# Patient Record
Sex: FEMALE | Race: BLACK OR AFRICAN AMERICAN | NOT HISPANIC OR LATINO | Employment: FULL TIME | URBAN - METROPOLITAN AREA
[De-identification: names, ages, dates, MRNs, and addresses within clinical notes are randomized per-mention and may not be internally consistent; named-entity substitution may affect disease eponyms.]

---

## 2018-02-18 PROCEDURE — 87081 CULTURE SCREEN ONLY: CPT | Performed by: EMERGENCY MEDICINE

## 2018-02-20 PROBLEM — J35.8 ASYMMETRIC TONSILS: Status: ACTIVE | Noted: 2018-02-20

## 2021-11-23 ENCOUNTER — HOSPITAL ENCOUNTER (EMERGENCY)
Age: 26
Discharge: HOME | End: 2021-11-24
Attending: EMERGENCY MEDICINE

## 2021-11-23 DIAGNOSIS — J10.1 INFLUENZA A: Primary | ICD-10-CM

## 2021-11-23 DIAGNOSIS — I31.9 MYOPERICARDITIS: ICD-10-CM

## 2021-11-23 LAB — LACTATE BLDV-SCNC: 1.4 MMOL/L

## 2021-11-23 PROCEDURE — 99285 EMERGENCY DEPT VISIT HI MDM: CPT

## 2021-11-23 PROCEDURE — 10004281 HB COUNTER-STAFF TIME PER 15 MIN

## 2021-11-23 PROCEDURE — 83605 ASSAY OF LACTIC ACID: CPT

## 2021-11-23 PROCEDURE — 96374 THER/PROPH/DIAG INJ IV PUSH: CPT

## 2021-11-23 PROCEDURE — C9803 HOPD COVID-19 SPEC COLLECT: HCPCS

## 2021-11-23 PROCEDURE — 96375 TX/PRO/DX INJ NEW DRUG ADDON: CPT

## 2021-11-23 PROCEDURE — 96361 HYDRATE IV INFUSION ADD-ON: CPT

## 2021-11-24 ENCOUNTER — APPOINTMENT (OUTPATIENT)
Dept: GENERAL RADIOLOGY | Age: 26
End: 2021-11-24
Attending: EMERGENCY MEDICINE

## 2021-11-24 ENCOUNTER — APPOINTMENT (OUTPATIENT)
Dept: CT IMAGING | Age: 26
End: 2021-11-24
Attending: EMERGENCY MEDICINE

## 2021-11-24 VITALS
HEIGHT: 67 IN | HEART RATE: 89 BPM | SYSTOLIC BLOOD PRESSURE: 138 MMHG | BODY MASS INDEX: 43.94 KG/M2 | WEIGHT: 279.98 LBS | TEMPERATURE: 98.9 F | RESPIRATION RATE: 18 BRPM | DIASTOLIC BLOOD PRESSURE: 82 MMHG | OXYGEN SATURATION: 99 %

## 2021-11-24 LAB
ALBUMIN SERPL-MCNC: 3.7 G/DL (ref 3.6–5.1)
ALBUMIN/GLOB SERPL: 0.9 {RATIO} (ref 1–2.4)
ALP SERPL-CCNC: 67 UNITS/L (ref 45–117)
ALT SERPL-CCNC: 31 UNITS/L
ANION GAP SERPL CALC-SCNC: 11 MMOL/L (ref 10–20)
AST SERPL-CCNC: 43 UNITS/L
ATRIAL RATE (BPM): 103
ATRIAL RATE (BPM): 90
BASOPHILS # BLD: 0 K/MCL (ref 0–0.3)
BASOPHILS NFR BLD: 1 %
BILIRUB SERPL-MCNC: 0.4 MG/DL (ref 0.2–1)
BUN SERPL-MCNC: 12 MG/DL (ref 6–20)
BUN/CREAT SERPL: 10 (ref 7–25)
CALCIUM SERPL-MCNC: 9.1 MG/DL (ref 8.4–10.2)
CHLORIDE SERPL-SCNC: 108 MMOL/L (ref 98–107)
CO2 SERPL-SCNC: 23 MMOL/L (ref 21–32)
CREAT SERPL-MCNC: 1.2 MG/DL (ref 0.51–0.95)
D DIMER PPP FEU-MCNC: 0.65 MG/L (FEU)
DEPRECATED RDW RBC: 40.6 FL (ref 39–50)
EOSINOPHIL # BLD: 0.1 K/MCL (ref 0–0.5)
EOSINOPHIL NFR BLD: 1 %
ERYTHROCYTE [DISTWIDTH] IN BLOOD: 13 % (ref 11–15)
FASTING DURATION TIME PATIENT: ABNORMAL H
FLUAV RNA NPH QL NAA+PROBE: DETECTED
FLUBV RNA NPH QL NAA+PROBE: NOT DETECTED
GFR SERPLBLD BASED ON 1.73 SQ M-ARVRAT: 62 ML/MIN
GLOBULIN SER-MCNC: 3.9 G/DL (ref 2–4)
GLUCOSE SERPL-MCNC: 100 MG/DL (ref 70–99)
HCG SERPL QL: NEGATIVE
HCT VFR BLD CALC: 38.7 % (ref 36–46.5)
HETEROPH AB SERPL QL IA: NEGATIVE
HGB BLD-MCNC: 13 G/DL (ref 12–15.5)
IMM GRANULOCYTES # BLD AUTO: 0 K/MCL (ref 0–0.2)
IMM GRANULOCYTES # BLD: 0 %
LYMPHOCYTES # BLD: 1.8 K/MCL (ref 1–4.8)
LYMPHOCYTES NFR BLD: 26 %
MCH RBC QN AUTO: 28.9 PG (ref 26–34)
MCHC RBC AUTO-ENTMCNC: 33.6 G/DL (ref 32–36.5)
MCV RBC AUTO: 86 FL (ref 78–100)
MONOCYTES # BLD: 1 K/MCL (ref 0.3–0.9)
MONOCYTES NFR BLD: 15 %
NEUTROPHILS # BLD: 4 K/MCL (ref 1.8–7.7)
NEUTROPHILS NFR BLD: 57 %
NRBC BLD MANUAL-RTO: 0 /100 WBC
P AXIS (DEGREES): 21
P AXIS (DEGREES): 23
PLATELET # BLD AUTO: 328 K/MCL (ref 140–450)
POTASSIUM SERPL-SCNC: 3.5 MMOL/L (ref 3.4–5.1)
PR-INTERVAL (MSEC): 154
PR-INTERVAL (MSEC): 166
PROT SERPL-MCNC: 7.6 G/DL (ref 6.4–8.2)
QRS-INTERVAL (MSEC): 68
QRS-INTERVAL (MSEC): 80
QT-INTERVAL (MSEC): 314
QT-INTERVAL (MSEC): 362
QTC: 411
QTC: 443
R AXIS (DEGREES): 49
R AXIS (DEGREES): 49
RAINBOW EXTRA TUBES HOLD SPECIMEN: NORMAL
RBC # BLD: 4.5 MIL/MCL (ref 4–5.2)
REPORT TEXT: NORMAL
REPORT TEXT: NORMAL
S PYO DNA THROAT QL NAA+PROBE: NOT DETECTED
SARS-COV-2 RNA RESP QL NAA+PROBE: NOT DETECTED
SARS-COV-2 RNA RESP QL NAA+PROBE: NOT DETECTED
SERVICE CMNT-IMP: ABNORMAL
SERVICE CMNT-IMP: ABNORMAL
SERVICE CMNT-IMP: NORMAL
SERVICE CMNT-IMP: NORMAL
SODIUM SERPL-SCNC: 138 MMOL/L (ref 135–145)
T AXIS (DEGREES): 1
T AXIS (DEGREES): 12
TROPONIN I SERPL DL<=0.01 NG/ML-MCNC: 131 NG/L
TROPONIN I SERPL DL<=0.01 NG/ML-MCNC: 138 NG/L
VENTRICULAR RATE EKG/MIN (BPM): 103
VENTRICULAR RATE EKG/MIN (BPM): 90
WBC # BLD: 7 K/MCL (ref 4.2–11)

## 2021-11-24 PROCEDURE — 71045 X-RAY EXAM CHEST 1 VIEW: CPT

## 2021-11-24 PROCEDURE — 70491 CT SOFT TISSUE NECK W/DYE: CPT

## 2021-11-24 PROCEDURE — 87040 BLOOD CULTURE FOR BACTERIA: CPT | Performed by: EMERGENCY MEDICINE

## 2021-11-24 PROCEDURE — 10002800 HB RX 250 W HCPCS: Performed by: EMERGENCY MEDICINE

## 2021-11-24 PROCEDURE — 87635 SARS-COV-2 COVID-19 AMP PRB: CPT | Performed by: EMERGENCY MEDICINE

## 2021-11-24 PROCEDURE — 86308 HETEROPHILE ANTIBODY SCREEN: CPT | Performed by: EMERGENCY MEDICINE

## 2021-11-24 PROCEDURE — 84484 ASSAY OF TROPONIN QUANT: CPT | Performed by: EMERGENCY MEDICINE

## 2021-11-24 PROCEDURE — 87636 SARSCOV2 & INF A&B AMP PRB: CPT | Performed by: EMERGENCY MEDICINE

## 2021-11-24 PROCEDURE — 85379 FIBRIN DEGRADATION QUANT: CPT | Performed by: EMERGENCY MEDICINE

## 2021-11-24 PROCEDURE — 93005 ELECTROCARDIOGRAM TRACING: CPT | Performed by: EMERGENCY MEDICINE

## 2021-11-24 PROCEDURE — 87651 STREP A DNA AMP PROBE: CPT | Performed by: EMERGENCY MEDICINE

## 2021-11-24 PROCEDURE — 86665 EPSTEIN-BARR CAPSID VCA: CPT | Performed by: EMERGENCY MEDICINE

## 2021-11-24 PROCEDURE — 10002805 HB CONTRAST AGENT: Performed by: EMERGENCY MEDICINE

## 2021-11-24 PROCEDURE — 10002807 HB RX 258: Performed by: EMERGENCY MEDICINE

## 2021-11-24 PROCEDURE — 80053 COMPREHEN METABOLIC PANEL: CPT | Performed by: EMERGENCY MEDICINE

## 2021-11-24 PROCEDURE — 84703 CHORIONIC GONADOTROPIN ASSAY: CPT | Performed by: EMERGENCY MEDICINE

## 2021-11-24 PROCEDURE — 85025 COMPLETE CBC W/AUTO DIFF WBC: CPT | Performed by: EMERGENCY MEDICINE

## 2021-11-24 PROCEDURE — 71275 CT ANGIOGRAPHY CHEST: CPT

## 2021-11-24 RX ORDER — DEXAMETHASONE SODIUM PHOSPHATE 4 MG/ML
10 INJECTION, SOLUTION INTRA-ARTICULAR; INTRALESIONAL; INTRAMUSCULAR; INTRAVENOUS; SOFT TISSUE ONCE
Status: COMPLETED | OUTPATIENT
Start: 2021-11-24 | End: 2021-11-24

## 2021-11-24 RX ORDER — IBUPROFEN 600 MG/1
600 TABLET ORAL 2 TIMES DAILY
Qty: 14 TABLET | Refills: 0 | Status: SHIPPED | OUTPATIENT
Start: 2021-11-24 | End: 2022-04-22 | Stop reason: ALTCHOICE

## 2021-11-24 RX ORDER — HYDROCODONE BITARTRATE AND HOMATROPINE METHYLBROMIDE ORAL SOLUTION 5; 1.5 MG/5ML; MG/5ML
5 LIQUID ORAL EVERY 6 HOURS PRN
Qty: 240 ML | Refills: 0 | Status: SHIPPED | OUTPATIENT
Start: 2021-11-24 | End: 2022-04-22 | Stop reason: ALTCHOICE

## 2021-11-24 RX ORDER — CEFAZOLIN SODIUM/WATER 2 G/20 ML
2000 SYRINGE (ML) INTRAVENOUS ONCE
Status: COMPLETED | OUTPATIENT
Start: 2021-11-24 | End: 2021-11-24

## 2021-11-24 RX ORDER — BENZONATATE 100 MG/1
100 CAPSULE ORAL 3 TIMES DAILY PRN
Qty: 20 CAPSULE | Refills: 0 | Status: SHIPPED | OUTPATIENT
Start: 2021-11-24 | End: 2022-04-22 | Stop reason: ALTCHOICE

## 2021-11-24 RX ADMIN — SODIUM CHLORIDE 1000 ML: 0.9 INJECTION, SOLUTION INTRAVENOUS at 00:24

## 2021-11-24 RX ADMIN — IOHEXOL 75 ML: 350 INJECTION, SOLUTION INTRAVENOUS at 02:14

## 2021-11-24 RX ADMIN — DEXAMETHASONE SODIUM PHOSPHATE 10 MG: 4 INJECTION, SOLUTION INTRAMUSCULAR; INTRAVENOUS at 00:26

## 2021-11-24 RX ADMIN — Medication 2000 MG: at 00:28

## 2021-11-24 ASSESSMENT — PAIN SCALES - GENERAL: PAINLEVEL_OUTOF10: 4

## 2021-11-24 ASSESSMENT — PAIN DESCRIPTION - PAIN TYPE: TYPE: ACUTE PAIN

## 2021-11-26 LAB
EBV VCA IGG SER-ACNC: 8 AI
EBV VCA IGM SER-ACNC: 0.2 AI

## 2021-11-28 LAB
BACTERIA BLD CULT: NORMAL
BACTERIA BLD CULT: NORMAL

## 2022-02-16 ENCOUNTER — TELEPHONE (OUTPATIENT)
Dept: SCHEDULING | Age: 27
End: 2022-02-16

## 2022-02-17 ENCOUNTER — APPOINTMENT (OUTPATIENT)
Dept: URGENT CARE | Age: 27
End: 2022-02-17

## 2022-04-20 ENCOUNTER — TELEPHONE (OUTPATIENT)
Dept: SCHEDULING | Age: 27
End: 2022-04-20

## 2022-04-21 ENCOUNTER — TELEPHONE (OUTPATIENT)
Dept: SCHEDULING | Age: 27
End: 2022-04-21

## 2022-04-22 ENCOUNTER — WALK IN (OUTPATIENT)
Dept: URGENT CARE | Age: 27
End: 2022-04-22

## 2022-04-22 ENCOUNTER — APPOINTMENT (OUTPATIENT)
Dept: URGENT CARE | Age: 27
End: 2022-04-22

## 2022-04-22 VITALS
HEIGHT: 69 IN | DIASTOLIC BLOOD PRESSURE: 82 MMHG | HEART RATE: 84 BPM | OXYGEN SATURATION: 99 % | WEIGHT: 281 LBS | TEMPERATURE: 97.3 F | BODY MASS INDEX: 41.62 KG/M2 | RESPIRATION RATE: 16 BRPM | SYSTOLIC BLOOD PRESSURE: 126 MMHG

## 2022-04-22 DIAGNOSIS — Z11.1 SCREENING FOR TUBERCULOSIS: Primary | ICD-10-CM

## 2022-04-22 DIAGNOSIS — Z02.1 PHYSICAL EXAM, PRE-EMPLOYMENT: ICD-10-CM

## 2022-04-22 PROCEDURE — X0945 SELF PAY APN OR PA PERFORMED ADMINISTRATIVE PHYSICAL: HCPCS | Performed by: NURSE PRACTITIONER

## 2022-04-22 PROCEDURE — 86580 TB INTRADERMAL TEST: CPT | Performed by: NURSE PRACTITIONER

## 2022-04-22 RX ORDER — ACETAMINOPHEN 325 MG/1
650 TABLET ORAL AT BEDTIME
COMMUNITY

## 2022-04-22 RX ORDER — FEXOFENADINE HCL 180 MG/1
180 TABLET ORAL DAILY
COMMUNITY

## 2022-04-22 ASSESSMENT — LIFESTYLE VARIABLES: SUBSTANCE_ABUSE: 0

## 2022-04-22 ASSESSMENT — ENCOUNTER SYMPTOMS
SORE THROAT: 0
TREMORS: 0
BRUISES/BLEEDS EASILY: 0
POLYDIPSIA: 0
ABDOMINAL PAIN: 0
FOCAL WEAKNESS: 0
HALLUCINATIONS: 0
HEADACHES: 1
SENSORY CHANGE: 0
TINGLING: 0
CONSTIPATION: 0
MEMORY LOSS: 0
NAUSEA: 1
BACK PAIN: 1
STRIDOR: 0
SINUS PAIN: 0
LOSS OF CONSCIOUSNESS: 0
DIZZINESS: 0
BLOOD IN STOOL: 0
HEARTBURN: 1
CONSTITUTIONAL NEGATIVE: 1
INSOMNIA: 1
RESPIRATORY NEGATIVE: 1
DEPRESSION: 0
VOMITING: 0
WEAKNESS: 0
DIARRHEA: 0
EYES NEGATIVE: 1
NERVOUS/ANXIOUS: 0
SPEECH CHANGE: 0
SEIZURES: 0

## 2022-04-22 ASSESSMENT — VISUAL ACUITY: OU: 1

## 2022-04-23 ENCOUNTER — TELEPHONE (OUTPATIENT)
Dept: SCHEDULING | Age: 27
End: 2022-04-23

## 2022-04-24 ENCOUNTER — WALK IN (OUTPATIENT)
Dept: URGENT CARE | Age: 27
End: 2022-04-24

## 2022-04-24 DIAGNOSIS — Z11.1 SCREENING FOR TUBERCULOSIS: Primary | ICD-10-CM

## 2022-12-30 ENCOUNTER — OFFICE VISIT (OUTPATIENT)
Dept: FAMILY MEDICINE CLINIC | Facility: CLINIC | Age: 27
End: 2022-12-30
Payer: COMMERCIAL

## 2022-12-30 ENCOUNTER — APPOINTMENT (OUTPATIENT)
Dept: GENERAL RADIOLOGY | Age: 27
End: 2022-12-30
Attending: EMERGENCY MEDICINE

## 2022-12-30 DIAGNOSIS — U07.1 COVID: Primary | ICD-10-CM

## 2022-12-30 DIAGNOSIS — R06.2 WHEEZE: ICD-10-CM

## 2022-12-30 LAB
OPERATOR ID: ABNORMAL
POCT LOT NUMBER: ABNORMAL
RAPID SARS-COV-2 BY PCR: DETECTED

## 2022-12-30 PROCEDURE — U0002 COVID-19 LAB TEST NON-CDC: HCPCS | Performed by: NURSE PRACTITIONER

## 2022-12-30 PROCEDURE — 99202 OFFICE O/P NEW SF 15 MIN: CPT | Performed by: NURSE PRACTITIONER

## 2022-12-30 PROCEDURE — 71046 X-RAY EXAM CHEST 2 VIEWS: CPT

## 2022-12-30 PROCEDURE — 99283 EMERGENCY DEPT VISIT LOW MDM: CPT

## 2022-12-30 RX ORDER — BENZONATATE 200 MG/1
200 CAPSULE ORAL 3 TIMES DAILY PRN
Qty: 30 CAPSULE | Refills: 0 | Status: SHIPPED | OUTPATIENT
Start: 2022-12-30

## 2022-12-30 RX ORDER — ALBUTEROL SULFATE 90 UG/1
2 AEROSOL, METERED RESPIRATORY (INHALATION) EVERY 6 HOURS PRN
Qty: 1 EACH | Refills: 0 | Status: SHIPPED | OUTPATIENT
Start: 2022-12-30

## 2022-12-30 ASSESSMENT — PAIN SCALES - GENERAL
PAINLEVEL_OUTOF10: 2
PAINLEVEL_OUTOF10: 8

## 2022-12-30 NOTE — PATIENT INSTRUCTIONS
-Covid Positive  -Day #6 is 1/2/2023  -Follow CDC guidelines  -Push fluids and plenty of rest    -albuterol as prescribed  -Benzonatate as prescribed for dry cough  -OTC cough medicine such as Mucinex DM or Robitussin DM (guaifenesin and dextromethorphan) as packet insert for congested cough. -OTC Tylenol/Ibuprofen as packet insert If no allergies  -Soothing cough drops as packet insert   -Flonase OTC 2 sprays each nostril daily as packet insert.   Stop if any nose bleeds  -Good handwashing, to prevent spread of virus    -Face mask helps prevent viral infections    Follow up in 3-5 days for worsening symptoms with PCP

## 2022-12-31 ENCOUNTER — HOSPITAL ENCOUNTER (EMERGENCY)
Age: 27
Discharge: HOME | End: 2022-12-31
Attending: EMERGENCY MEDICINE

## 2022-12-31 VITALS
HEART RATE: 84 BPM | RESPIRATION RATE: 17 BRPM | BODY MASS INDEX: 43.39 KG/M2 | TEMPERATURE: 98.2 F | WEIGHT: 270 LBS | OXYGEN SATURATION: 99 % | SYSTOLIC BLOOD PRESSURE: 131 MMHG | DIASTOLIC BLOOD PRESSURE: 72 MMHG | HEIGHT: 66 IN

## 2022-12-31 DIAGNOSIS — U07.1 COVID-19 VIRUS INFECTION: Primary | ICD-10-CM

## 2024-01-30 ENCOUNTER — HOSPITAL ENCOUNTER (OUTPATIENT)
Age: 29
Setting detail: OBSERVATION
Discharge: HOME | End: 2024-01-30
Attending: EMERGENCY MEDICINE | Admitting: FAMILY MEDICINE

## 2024-01-30 ENCOUNTER — APPOINTMENT (OUTPATIENT)
Dept: CT IMAGING | Age: 29
End: 2024-01-30
Attending: EMERGENCY MEDICINE

## 2024-01-30 ENCOUNTER — APPOINTMENT (OUTPATIENT)
Dept: GENERAL RADIOLOGY | Age: 29
End: 2024-01-30
Attending: EMERGENCY MEDICINE

## 2024-01-30 ENCOUNTER — APPOINTMENT (OUTPATIENT)
Dept: CARDIOLOGY | Age: 29
End: 2024-01-30
Attending: NURSE PRACTITIONER

## 2024-01-30 VITALS
SYSTOLIC BLOOD PRESSURE: 163 MMHG | DIASTOLIC BLOOD PRESSURE: 92 MMHG | OXYGEN SATURATION: 97 % | RESPIRATION RATE: 18 BRPM | WEIGHT: 286.82 LBS | TEMPERATURE: 97.7 F | BODY MASS INDEX: 45.02 KG/M2 | HEIGHT: 67 IN | HEART RATE: 102 BPM

## 2024-01-30 DIAGNOSIS — U07.1 COVID-19 VIRUS INFECTION: Primary | ICD-10-CM

## 2024-01-30 LAB
ALBUMIN SERPL-MCNC: 3.8 G/DL (ref 3.6–5.1)
ALBUMIN/GLOB SERPL: 1 {RATIO} (ref 1–2.4)
ALP SERPL-CCNC: 69 UNITS/L (ref 45–117)
ALT SERPL-CCNC: 18 UNITS/L
ANION GAP SERPL CALC-SCNC: 9 MMOL/L (ref 7–19)
AORTIC VALVE AREA (AVA): 1.05
AORTIC VALVE AREA: 2.44
AST SERPL-CCNC: 15 UNITS/L
ATRIAL RATE (BPM): 106
AV MEAN GRADIENT (AVMG): 6
AV MEAN VELOCITY (AVMV): 1.15
AV PEAK GRADIENT (AVPG): 15
AV PEAK VELOCITY (AVPV): 1.92
AV STENOSIS SEVERITY TEXT: NORMAL
AVI LVOT PEAK GRADIENT (LVOTMG): 1
BASOPHILS # BLD: 0 K/MCL (ref 0–0.3)
BASOPHILS NFR BLD: 1 %
BILIRUB SERPL-MCNC: 0.7 MG/DL (ref 0.2–1)
BUN SERPL-MCNC: 8 MG/DL (ref 6–20)
BUN/CREAT SERPL: 7 (ref 7–25)
CALCIUM SERPL-MCNC: 8.7 MG/DL (ref 8.4–10.2)
CHLORIDE SERPL-SCNC: 106 MMOL/L (ref 97–110)
CO2 SERPL-SCNC: 25 MMOL/L (ref 21–32)
CREAT SERPL-MCNC: 1.14 MG/DL (ref 0.51–0.95)
D DIMER PPP FEU-MCNC: 0.76 MG/L (FEU)
DEPRECATED RDW RBC: 41.4 FL (ref 39–50)
E WAVE DECELARATION TIME (MDT): 11.49
EGFRCR SERPLBLD CKD-EPI 2021: 67 ML/MIN/{1.73_M2}
EOSINOPHIL # BLD: 0.1 K/MCL (ref 0–0.5)
EOSINOPHIL NFR BLD: 1 %
ERYTHROCYTE [DISTWIDTH] IN BLOOD: 13.1 % (ref 11–15)
FASTING DURATION TIME PATIENT: ABNORMAL H
FLUAV RNA RESP QL NAA+PROBE: NOT DETECTED
FLUBV RNA RESP QL NAA+PROBE: NOT DETECTED
GLOBULIN SER-MCNC: 3.7 G/DL (ref 2–4)
GLUCOSE SERPL-MCNC: 101 MG/DL (ref 70–99)
HCT VFR BLD CALC: 39.2 % (ref 36–46.5)
HGB BLD-MCNC: 12.9 G/DL (ref 12–15.5)
IMM GRANULOCYTES # BLD AUTO: 0 K/MCL (ref 0–0.2)
IMM GRANULOCYTES # BLD: 0 %
INTERVENTRICULAR SEPTUM IN END DIASTOLE (IVSD): 2.49
LEFT INTERNAL DIMENSION IN SYSTOLE (LVSD): 1.1
LEFT VENTRICULAR INTERNAL DIMENSION IN DIASTOLE (LVDD): 3.9
LEFT VENTRICULAR POSTERIOR WALL IN END DIASTOLE (LVPW): 5.6
LV EF: NORMAL %
LVOT 2D (LVOTD): 29.5
LVOT VTI (LVOTVTI): 1.4
LYMPHOCYTES # BLD: 1.4 K/MCL (ref 1–4.8)
LYMPHOCYTES NFR BLD: 19 %
MAGNESIUM SERPL-MCNC: 1.9 MG/DL (ref 1.7–2.4)
MCH RBC QN AUTO: 28.6 PG (ref 26–34)
MCHC RBC AUTO-ENTMCNC: 32.9 G/DL (ref 32–36.5)
MCV RBC AUTO: 86.9 FL (ref 78–100)
MONOCYTES # BLD: 0.7 K/MCL (ref 0.3–0.9)
MONOCYTES NFR BLD: 9 %
MV E TISSUE VEL MED (MESV): 11.2
MV E WAVE VEL/E TISSUE VEL MED(MSR): 9.14
MV PEAK A VELOCITY (MVPAV): 204
MV PEAK E VELOCITY (MVPEV): 0.69
NEUTROPHILS # BLD: 5.1 K/MCL (ref 1.8–7.7)
NEUTROPHILS NFR BLD: 70 %
NRBC BLD MANUAL-RTO: 0 /100 WBC
NT-PROBNP SERPL-MCNC: 44 PG/ML
P AXIS (DEGREES): 17
PLATELET # BLD AUTO: 365 K/MCL (ref 140–450)
POTASSIUM SERPL-SCNC: 3.4 MMOL/L (ref 3.4–5.1)
PR-INTERVAL (MSEC): 146
PROT SERPL-MCNC: 7.5 G/DL (ref 6.4–8.2)
QRS-INTERVAL (MSEC): 72
QT-INTERVAL (MSEC): 318
QTC: 422
R AXIS (DEGREES): 58
RAINBOW EXTRA TUBES HOLD SPECIMEN: NORMAL
RBC # BLD: 4.51 MIL/MCL (ref 4–5.2)
REPORT TEXT: NORMAL
RSV AG NPH QL IA.RAPID: NOT DETECTED
RV END SYSTOLIC LONGITUDINAL STRAIN FREE WALL (RVGS): 2
SARS-COV-2 N GENE CT SPEC QN NAA N2: 16.5
SARS-COV-2 RNA RESP QL NAA+PROBE: DETECTED
SERVICE CMNT-IMP: ABNORMAL
SODIUM SERPL-SCNC: 137 MMOL/L (ref 135–145)
T AXIS (DEGREES): 17
TRICUSPID VALVE PEAK REGURGITATION VELOCITY (TRPV): 2.8
TROPONIN I SERPL DL<=0.01 NG/ML-MCNC: 109 NG/L
TROPONIN I SERPL DL<=0.01 NG/ML-MCNC: 119 NG/L
TV ESTIMATED RIGHT ARTERIAL PRESSURE (RAP): 16.2
VENTRICULAR RATE EKG/MIN (BPM): 106
WBC # BLD: 7.2 K/MCL (ref 4.2–11)

## 2024-01-30 PROCEDURE — 10002803 HB RX 637: Performed by: EMERGENCY MEDICINE

## 2024-01-30 PROCEDURE — 85025 COMPLETE CBC W/AUTO DIFF WBC: CPT | Performed by: EMERGENCY MEDICINE

## 2024-01-30 PROCEDURE — 99253 IP/OBS CNSLTJ NEW/EST LOW 45: CPT | Performed by: INTERNAL MEDICINE

## 2024-01-30 PROCEDURE — 71275 CT ANGIOGRAPHY CHEST: CPT

## 2024-01-30 PROCEDURE — 93010 ELECTROCARDIOGRAM REPORT: CPT | Performed by: INTERNAL MEDICINE

## 2024-01-30 PROCEDURE — 83880 ASSAY OF NATRIURETIC PEPTIDE: CPT | Performed by: EMERGENCY MEDICINE

## 2024-01-30 PROCEDURE — 10004651 HB RX, NO CHARGE ITEM: Performed by: EMERGENCY MEDICINE

## 2024-01-30 PROCEDURE — 93306 TTE W/DOPPLER COMPLETE: CPT | Performed by: INTERNAL MEDICINE

## 2024-01-30 PROCEDURE — 71046 X-RAY EXAM CHEST 2 VIEWS: CPT

## 2024-01-30 PROCEDURE — 10004651 HB RX, NO CHARGE ITEM: Performed by: FAMILY MEDICINE

## 2024-01-30 PROCEDURE — 93306 TTE W/DOPPLER COMPLETE: CPT

## 2024-01-30 PROCEDURE — 85379 FIBRIN DEGRADATION QUANT: CPT | Performed by: EMERGENCY MEDICINE

## 2024-01-30 PROCEDURE — 80053 COMPREHEN METABOLIC PANEL: CPT | Performed by: EMERGENCY MEDICINE

## 2024-01-30 PROCEDURE — 83735 ASSAY OF MAGNESIUM: CPT | Performed by: EMERGENCY MEDICINE

## 2024-01-30 PROCEDURE — 10002803 HB RX 637: Performed by: FAMILY MEDICINE

## 2024-01-30 PROCEDURE — G0378 HOSPITAL OBSERVATION PER HR: HCPCS

## 2024-01-30 PROCEDURE — 10002800 HB RX 250 W HCPCS: Performed by: EMERGENCY MEDICINE

## 2024-01-30 PROCEDURE — 94640 AIRWAY INHALATION TREATMENT: CPT

## 2024-01-30 PROCEDURE — 93005 ELECTROCARDIOGRAM TRACING: CPT | Performed by: EMERGENCY MEDICINE

## 2024-01-30 PROCEDURE — 10002801 HB RX 250 W/O HCPCS: Performed by: EMERGENCY MEDICINE

## 2024-01-30 PROCEDURE — 0241U COVID/FLU/RSV PANEL: CPT | Performed by: EMERGENCY MEDICINE

## 2024-01-30 PROCEDURE — 10002805 HB CONTRAST AGENT: Performed by: EMERGENCY MEDICINE

## 2024-01-30 PROCEDURE — 84484 ASSAY OF TROPONIN QUANT: CPT | Performed by: EMERGENCY MEDICINE

## 2024-01-30 RX ORDER — ACETAMINOPHEN 500 MG
1000 TABLET ORAL ONCE
Status: COMPLETED | OUTPATIENT
Start: 2024-01-30 | End: 2024-01-30

## 2024-01-30 RX ORDER — LORAZEPAM 2 MG/ML
1 INJECTION INTRAMUSCULAR ONCE
Status: COMPLETED | OUTPATIENT
Start: 2024-01-30 | End: 2024-01-30

## 2024-01-30 RX ORDER — DEXAMETHASONE 4 MG/1
4 TABLET ORAL
Qty: 5 TABLET | Refills: 0 | Status: SHIPPED | OUTPATIENT
Start: 2024-01-30

## 2024-01-30 RX ORDER — ACETAMINOPHEN 325 MG/1
650 TABLET ORAL EVERY 4 HOURS PRN
Status: DISCONTINUED | OUTPATIENT
Start: 2024-01-30 | End: 2024-01-30 | Stop reason: HOSPADM

## 2024-01-30 RX ORDER — LANOLIN ALCOHOL/MO/W.PET/CERES
6 CREAM (GRAM) TOPICAL NIGHTLY PRN
Status: DISCONTINUED | OUTPATIENT
Start: 2024-01-30 | End: 2024-01-30 | Stop reason: HOSPADM

## 2024-01-30 RX ORDER — ONDANSETRON 2 MG/ML
4 INJECTION INTRAMUSCULAR; INTRAVENOUS EVERY 12 HOURS PRN
Status: DISCONTINUED | OUTPATIENT
Start: 2024-01-30 | End: 2024-01-30 | Stop reason: HOSPADM

## 2024-01-30 RX ORDER — ENOXAPARIN SODIUM 100 MG/ML
40 INJECTION SUBCUTANEOUS EVERY 12 HOURS SCHEDULED
Status: DISCONTINUED | OUTPATIENT
Start: 2024-01-30 | End: 2024-01-30 | Stop reason: HOSPADM

## 2024-01-30 RX ORDER — 0.9 % SODIUM CHLORIDE 0.9 %
2 VIAL (ML) INJECTION EVERY 12 HOURS SCHEDULED
Status: DISCONTINUED | OUTPATIENT
Start: 2024-01-30 | End: 2024-01-30 | Stop reason: HOSPADM

## 2024-01-30 RX ORDER — DEXAMETHASONE 4 MG/1
10 TABLET ORAL ONCE
Status: COMPLETED | OUTPATIENT
Start: 2024-01-30 | End: 2024-01-30

## 2024-01-30 RX ORDER — ACETAMINOPHEN 650 MG/1
650 SUPPOSITORY RECTAL EVERY 4 HOURS PRN
Status: DISCONTINUED | OUTPATIENT
Start: 2024-01-30 | End: 2024-01-30 | Stop reason: HOSPADM

## 2024-01-30 RX ORDER — ONDANSETRON 4 MG/1
4 TABLET, ORALLY DISINTEGRATING ORAL EVERY 6 HOURS PRN
Status: DISCONTINUED | OUTPATIENT
Start: 2024-01-30 | End: 2024-01-30 | Stop reason: HOSPADM

## 2024-01-30 RX ORDER — CYCLOBENZAPRINE HCL 10 MG
10 TABLET ORAL 3 TIMES DAILY PRN
Status: DISCONTINUED | OUTPATIENT
Start: 2024-01-30 | End: 2024-01-30 | Stop reason: HOSPADM

## 2024-01-30 RX ORDER — CALCIUM CARBONATE 500 MG/1
500 TABLET, CHEWABLE ORAL EVERY 4 HOURS PRN
Status: DISCONTINUED | OUTPATIENT
Start: 2024-01-30 | End: 2024-01-30 | Stop reason: HOSPADM

## 2024-01-30 RX ORDER — POLYETHYLENE GLYCOL 3350 17 G/17G
17 POWDER, FOR SOLUTION ORAL DAILY PRN
Status: DISCONTINUED | OUTPATIENT
Start: 2024-01-30 | End: 2024-01-30 | Stop reason: HOSPADM

## 2024-01-30 RX ORDER — BENZONATATE 100 MG/1
200 CAPSULE ORAL ONCE
Status: COMPLETED | OUTPATIENT
Start: 2024-01-30 | End: 2024-01-30

## 2024-01-30 RX ORDER — FAMOTIDINE 20 MG/1
20 TABLET, FILM COATED ORAL DAILY
Status: DISCONTINUED | OUTPATIENT
Start: 2024-01-30 | End: 2024-01-30 | Stop reason: HOSPADM

## 2024-01-30 RX ORDER — POTASSIUM CHLORIDE 20 MEQ/1
40 TABLET, EXTENDED RELEASE ORAL ONCE
Status: COMPLETED | OUTPATIENT
Start: 2024-01-30 | End: 2024-01-30

## 2024-01-30 RX ORDER — IPRATROPIUM BROMIDE AND ALBUTEROL SULFATE 2.5; .5 MG/3ML; MG/3ML
3 SOLUTION RESPIRATORY (INHALATION) ONCE
Status: COMPLETED | OUTPATIENT
Start: 2024-01-30 | End: 2024-01-30

## 2024-01-30 RX ORDER — IBUPROFEN 600 MG/1
600 TABLET ORAL EVERY 6 HOURS PRN
Qty: 40 TABLET | Refills: 0 | Status: SHIPPED | OUTPATIENT
Start: 2024-01-30 | End: 2024-02-09

## 2024-01-30 RX ADMIN — LORAZEPAM 1 MG: 2 INJECTION INTRAMUSCULAR; INTRAVENOUS at 04:41

## 2024-01-30 RX ADMIN — ACETAMINOPHEN 1000 MG: 500 TABLET ORAL at 01:53

## 2024-01-30 RX ADMIN — LORAZEPAM 1 MG: 2 INJECTION INTRAMUSCULAR; INTRAVENOUS at 03:19

## 2024-01-30 RX ADMIN — SODIUM CHLORIDE 2 ML: 9 INJECTION, SOLUTION INTRAMUSCULAR; INTRAVENOUS; SUBCUTANEOUS at 11:30

## 2024-01-30 RX ADMIN — DEXAMETHASONE 10 MG: 4 TABLET ORAL at 03:21

## 2024-01-30 RX ADMIN — BENZONATATE 200 MG: 100 CAPSULE ORAL at 05:38

## 2024-01-30 RX ADMIN — FAMOTIDINE 20 MG: 20 TABLET ORAL at 13:53

## 2024-01-30 RX ADMIN — IOHEXOL 55 ML: 350 INJECTION, SOLUTION INTRAVENOUS at 04:12

## 2024-01-30 RX ADMIN — POTASSIUM CHLORIDE 40 MEQ: 1500 TABLET, EXTENDED RELEASE ORAL at 13:53

## 2024-01-30 RX ADMIN — IPRATROPIUM BROMIDE AND ALBUTEROL SULFATE 3 ML: .5; 3 SOLUTION RESPIRATORY (INHALATION) at 02:51

## 2024-01-30 SDOH — ECONOMIC STABILITY: HOUSING INSECURITY: DO YOU HAVE PROBLEMS WITH ANY OF THE FOLLOWING?: NONE OF THE ABOVE

## 2024-01-30 SDOH — SOCIAL STABILITY: SOCIAL INSECURITY: HOW OFTEN DOES ANYONE, INCLUDING FAMILY AND FRIENDS, PHYSICALLY HURT YOU?: NEVER

## 2024-01-30 SDOH — SOCIAL STABILITY: SOCIAL NETWORK: SUPPORT SYSTEMS: FAMILY MEMBERS

## 2024-01-30 SDOH — ECONOMIC STABILITY: HOUSING INSECURITY: WHAT IS YOUR LIVING SITUATION TODAY?: FAMILY MEMBERS

## 2024-01-30 SDOH — HEALTH STABILITY: GENERAL
BECAUSE OF A PHYSICAL, MENTAL, OR EMOTIONAL CONDITION, DO YOU HAVE SERIOUS DIFFICULTY CONCENTRATING, REMEMBERING OR MAKING DECISIONS?: NO

## 2024-01-30 SDOH — SOCIAL STABILITY: SOCIAL INSECURITY: HOW OFTEN DOES ANYONE, INCLUDING FAMILY AND FRIENDS, INSULT OR TALK DOWN TO YOU?: NEVER

## 2024-01-30 SDOH — HEALTH STABILITY: PHYSICAL HEALTH: DO YOU HAVE SERIOUS DIFFICULTY WALKING OR CLIMBING STAIRS?: NO

## 2024-01-30 SDOH — ECONOMIC STABILITY: FOOD INSECURITY: WITHIN THE PAST 12 MONTHS, THE FOOD YOU BOUGHT JUST DIDN'T LAST AND YOU DIDN'T HAVE MONEY TO GET MORE.: PATIENT DECLINED

## 2024-01-30 SDOH — ECONOMIC STABILITY: HOUSING INSECURITY: WHAT IS YOUR LIVING SITUATION TODAY?: HOUSE

## 2024-01-30 SDOH — HEALTH STABILITY: PHYSICAL HEALTH: DO YOU HAVE DIFFICULTY DRESSING OR BATHING?: NO

## 2024-01-30 SDOH — SOCIAL STABILITY: SOCIAL INSECURITY: HOW OFTEN DOES ANYONE, INCLUDING FAMILY AND FRIENDS, THREATEN YOU WITH HARM?: NEVER

## 2024-01-30 SDOH — ECONOMIC STABILITY: TRANSPORTATION INSECURITY
IN THE PAST 12 MONTHS, HAS LACK OF RELIABLE TRANSPORTATION KEPT YOU FROM MEDICAL APPOINTMENTS, MEETINGS, WORK OR FROM GETTING THINGS NEEDED FOR DAILY LIVING?: NO

## 2024-01-30 SDOH — SOCIAL STABILITY: SOCIAL INSECURITY: HOW OFTEN DOES ANYONE, INCLUDING FAMILY AND FRIENDS, SCREAM OR CURSE AT YOU?: NEVER

## 2024-01-30 SDOH — ECONOMIC STABILITY: GENERAL

## 2024-01-30 SDOH — HEALTH STABILITY: GENERAL: BECAUSE OF A PHYSICAL, MENTAL, OR EMOTIONAL CONDITION, DO YOU HAVE DIFFICULTY DOING ERRANDS ALONE?: NO

## 2024-01-30 SDOH — ECONOMIC STABILITY: INCOME INSECURITY: IN THE PAST 12 MONTHS, HAS THE ELECTRIC, GAS, OIL, OR WATER COMPANY THREATENED TO SHUT OFF SERVICE IN YOUR HOME?: NO

## 2024-01-30 SDOH — ECONOMIC STABILITY: GENERAL: WOULD YOU LIKE HELP WITH ANY OF THE FOLLOWING NEEDS?: I DON'T WANT HELP WITH ANY OF THESE

## 2024-01-30 SDOH — SOCIAL STABILITY: SOCIAL NETWORK
HOW OFTEN DO YOU SEE OR TALK TO PEOPLE THAT YOU CARE ABOUT AND FEEL CLOSE TO? (FOR EXAMPLE: TALKING TO FRIENDS ON THE PHONE, VISITING FRIENDS OR FAMILY, GOING TO CHURCH OR CLUB MEETINGS): 3 TO 5 TIMES A WEEK

## 2024-01-30 SDOH — ECONOMIC STABILITY: HOUSING INSECURITY: WHAT IS YOUR LIVING SITUATION TODAY?: I HAVE A STEADY PLACE TO LIVE

## 2024-01-30 ASSESSMENT — COLUMBIA-SUICIDE SEVERITY RATING SCALE - C-SSRS
6. HAVE YOU EVER DONE ANYTHING, STARTED TO DO ANYTHING, OR PREPARED TO DO ANYTHING TO END YOUR LIFE?: NO
2. HAVE YOU ACTUALLY HAD ANY THOUGHTS OF KILLING YOURSELF?: NO
1. WITHIN THE PAST MONTH, HAVE YOU WISHED YOU WERE DEAD OR WISHED YOU COULD GO TO SLEEP AND NOT WAKE UP?: NO
IS THE PATIENT ABLE TO COMPLETE C-SSRS: YES

## 2024-01-30 ASSESSMENT — LIFESTYLE VARIABLES
HOW MANY STANDARD DRINKS CONTAINING ALCOHOL DO YOU HAVE ON A TYPICAL DAY: 0,1 OR 2
HOW OFTEN DO YOU HAVE A DRINK CONTAINING ALCOHOL: MONTHLY OR LESS
ALCOHOL_USE_STATUS: NO OR LOW RISK WITH VALIDATED TOOL
AUDIT-C TOTAL SCORE: 2
HOW OFTEN DO YOU HAVE 6 OR MORE DRINKS ON ONE OCCASION: LESS THAN MONTHLY

## 2024-01-30 ASSESSMENT — PATIENT HEALTH QUESTIONNAIRE - PHQ9
1. LITTLE INTEREST OR PLEASURE IN DOING THINGS: NOT AT ALL
CLINICAL INTERPRETATION OF PHQ2 SCORE: NO FURTHER SCREENING NEEDED
SUM OF ALL RESPONSES TO PHQ9 QUESTIONS 1 AND 2: 0
SUM OF ALL RESPONSES TO PHQ9 QUESTIONS 1 AND 2: 0
2. FEELING DOWN, DEPRESSED OR HOPELESS: NOT AT ALL
IS PATIENT ABLE TO COMPLETE PHQ2 OR PHQ9: YES

## 2024-01-30 ASSESSMENT — PAIN SCALES - GENERAL
PAINLEVEL_OUTOF10: 0
PAINLEVEL_OUTOF10: 0
PAINLEVEL_OUTOF10: 2

## 2024-01-30 ASSESSMENT — ACTIVITIES OF DAILY LIVING (ADL)
ADL_SCORE: 12
RECENT_DECLINE_ADL: NO
ADL_BEFORE_ADMISSION: INDEPENDENT
ADL_SHORT_OF_BREATH: NO

## 2024-01-31 PROBLEM — R03.0 ELEVATED BLOOD PRESSURE READING WITHOUT DIAGNOSIS OF HYPERTENSION: Status: ACTIVE | Noted: 2024-01-31

## 2024-01-31 PROBLEM — F90.9 ATTENTION DEFICIT HYPERACTIVITY DISORDER (ADHD): Status: ACTIVE | Noted: 2024-01-31

## 2024-01-31 PROBLEM — F32.A DEPRESSION: Status: ACTIVE | Noted: 2023-06-27

## 2024-01-31 PROBLEM — J45.909 ASTHMA WITHOUT STATUS ASTHMATICUS: Status: ACTIVE | Noted: 2024-01-31

## 2024-01-31 ASSESSMENT — ENCOUNTER SYMPTOMS
SPUTUM PRODUCTION: 1
WEAKNESS: 0
BLURRED VISION: 0
ABDOMINAL PAIN: 0
GASTROINTESTINAL NEGATIVE: 1
COUGH: 1
NAUSEA: 0
SHORTNESS OF BREATH: 1
FEVER: 0
WHEEZING: 0
HEMOPTYSIS: 0
PSYCHIATRIC NEGATIVE: 1
PHOTOPHOBIA: 0
HEARTBURN: 0
DOUBLE VISION: 0
DIZZINESS: 0
HEADACHES: 0
CHILLS: 0
CONSTITUTIONAL NEGATIVE: 1
VOMITING: 0
ORTHOPNEA: 0
FOCAL WEAKNESS: 0
SORE THROAT: 0
TINGLING: 0
EYES NEGATIVE: 1
DIARRHEA: 0

## 2024-07-07 ENCOUNTER — HOSPITAL ENCOUNTER (EMERGENCY)
Age: 29
Discharge: PSYCHIATRIC OR SUBSTANCE USE FACILITY | End: 2024-07-08
Attending: EMERGENCY MEDICINE

## 2024-07-07 DIAGNOSIS — F99 PSYCHIATRIC PROBLEM: Primary | ICD-10-CM

## 2024-07-07 PROCEDURE — 80179 DRUG ASSAY SALICYLATE: CPT | Performed by: EMERGENCY MEDICINE

## 2024-07-07 PROCEDURE — 0241U COVID/FLU/RSV PANEL: CPT | Performed by: EMERGENCY MEDICINE

## 2024-07-07 PROCEDURE — 80307 DRUG TEST PRSMV CHEM ANLYZR: CPT | Performed by: EMERGENCY MEDICINE

## 2024-07-07 PROCEDURE — 99285 EMERGENCY DEPT VISIT HI MDM: CPT

## 2024-07-07 PROCEDURE — 82077 ASSAY SPEC XCP UR&BREATH IA: CPT | Performed by: EMERGENCY MEDICINE

## 2024-07-07 PROCEDURE — 36415 COLL VENOUS BLD VENIPUNCTURE: CPT

## 2024-07-07 PROCEDURE — 80143 DRUG ASSAY ACETAMINOPHEN: CPT | Performed by: EMERGENCY MEDICINE

## 2024-07-07 PROCEDURE — 81025 URINE PREGNANCY TEST: CPT | Performed by: EMERGENCY MEDICINE

## 2024-07-07 PROCEDURE — 80048 BASIC METABOLIC PNL TOTAL CA: CPT | Performed by: EMERGENCY MEDICINE

## 2024-07-07 PROCEDURE — 85025 COMPLETE CBC W/AUTO DIFF WBC: CPT | Performed by: EMERGENCY MEDICINE

## 2024-07-07 ASSESSMENT — PAIN SCALES - GENERAL: PAINLEVEL_OUTOF10: 0

## 2024-07-08 VITALS
TEMPERATURE: 98.3 F | HEIGHT: 67 IN | WEIGHT: 264.77 LBS | OXYGEN SATURATION: 99 % | BODY MASS INDEX: 41.56 KG/M2 | SYSTOLIC BLOOD PRESSURE: 119 MMHG | DIASTOLIC BLOOD PRESSURE: 76 MMHG | RESPIRATION RATE: 18 BRPM | HEART RATE: 80 BPM

## 2024-07-08 LAB
AMPHETAMINES UR QL SCN>500 NG/ML: NEGATIVE
ANION GAP SERPL CALC-SCNC: 8 MMOL/L (ref 7–19)
APAP SERPL-MCNC: <2 MCG/ML (ref 10–30)
B-HCG UR QL: NEGATIVE
BARBITURATES UR QL SCN>200 NG/ML: NEGATIVE
BASOPHILS # BLD: 0 K/MCL (ref 0–0.3)
BASOPHILS NFR BLD: 1 %
BENZODIAZ UR QL SCN>200 NG/ML: NEGATIVE
BUN SERPL-MCNC: 11 MG/DL (ref 6–20)
BUN/CREAT SERPL: 9 (ref 7–25)
BZE UR QL SCN>150 NG/ML: NEGATIVE
CALCIUM SERPL-MCNC: 9 MG/DL (ref 8.4–10.2)
CANNABINOIDS UR QL SCN>50 NG/ML: NEGATIVE
CHLORIDE SERPL-SCNC: 109 MMOL/L (ref 97–110)
CO2 SERPL-SCNC: 26 MMOL/L (ref 21–32)
CREAT SERPL-MCNC: 1.21 MG/DL (ref 0.51–0.95)
DEPRECATED RDW RBC: 42.3 FL (ref 39–50)
EGFRCR SERPLBLD CKD-EPI 2021: 62 ML/MIN/{1.73_M2}
EOSINOPHIL # BLD: 0.2 K/MCL (ref 0–0.5)
EOSINOPHIL NFR BLD: 3 %
ERYTHROCYTE [DISTWIDTH] IN BLOOD: 13.1 % (ref 11–15)
ETHANOL SERPL-MCNC: NORMAL MG/DL
FASTING DURATION TIME PATIENT: ABNORMAL H
FENTANYL UR QL SCN: NEGATIVE
FLUAV RNA RESP QL NAA+PROBE: NOT DETECTED
FLUBV RNA RESP QL NAA+PROBE: NOT DETECTED
GLUCOSE SERPL-MCNC: 88 MG/DL (ref 70–99)
HCG UR QL: NEGATIVE
HCT VFR BLD CALC: 39.6 % (ref 36–46.5)
HGB BLD-MCNC: 12.9 G/DL (ref 12–15.5)
IMM GRANULOCYTES # BLD AUTO: 0 K/MCL (ref 0–0.2)
IMM GRANULOCYTES # BLD: 0 %
INTERNAL PROCEDURAL CONTROLS ACCEPTABLE: YES
LYMPHOCYTES # BLD: 2.2 K/MCL (ref 1–4.8)
LYMPHOCYTES NFR BLD: 41 %
MCH RBC QN AUTO: 28.7 PG (ref 26–34)
MCHC RBC AUTO-ENTMCNC: 32.6 G/DL (ref 32–36.5)
MCV RBC AUTO: 88.2 FL (ref 78–100)
MONOCYTES # BLD: 0.3 K/MCL (ref 0.3–0.9)
MONOCYTES NFR BLD: 6 %
NEUTROPHILS # BLD: 2.7 K/MCL (ref 1.8–7.7)
NEUTROPHILS NFR BLD: 49 %
NRBC BLD MANUAL-RTO: 0 /100 WBC
OPIATES UR QL SCN>300 NG/ML: NEGATIVE
PCP UR QL SCN>25 NG/ML: NEGATIVE
PLATELET # BLD AUTO: 356 K/MCL (ref 140–450)
POTASSIUM SERPL-SCNC: 4 MMOL/L (ref 3.4–5.1)
RAINBOW EXTRA TUBES HOLD SPECIMEN: NORMAL
RBC # BLD: 4.49 MIL/MCL (ref 4–5.2)
RSV AG NPH QL IA.RAPID: NOT DETECTED
SALICYLATES SERPL-MCNC: <2.8 MG/DL
SARS-COV-2 RNA RESP QL NAA+PROBE: NOT DETECTED
SERVICE CMNT-IMP: NORMAL
SERVICE CMNT-IMP: NORMAL
SODIUM SERPL-SCNC: 139 MMOL/L (ref 135–145)
TEST LOT EXPIRATION DATE: NORMAL
TEST LOT NUMBER: NORMAL
WBC # BLD: 5.4 K/MCL (ref 4.2–11)

## 2024-07-08 PROCEDURE — 10002803 HB RX 637: Performed by: EMERGENCY MEDICINE

## 2024-07-08 PROCEDURE — 90839 PSYTX CRISIS INITIAL 60 MIN: CPT

## 2024-07-08 PROCEDURE — 84703 CHORIONIC GONADOTROPIN ASSAY: CPT

## 2024-07-08 RX ORDER — QUETIAPINE FUMARATE 100 MG/1
100 TABLET, FILM COATED ORAL ONCE
Status: COMPLETED | OUTPATIENT
Start: 2024-07-08 | End: 2024-07-08

## 2024-07-08 RX ADMIN — QUETIAPINE FUMARATE 100 MG: 100 TABLET, FILM COATED ORAL at 04:54

## 2024-07-08 SDOH — ECONOMIC STABILITY: HOUSING INSECURITY: WHAT IS YOUR LIVING SITUATION TODAY?: I HAVE A STEADY PLACE TO LIVE

## 2024-07-08 SDOH — ECONOMIC STABILITY: FOOD INSECURITY: WITHIN THE PAST 12 MONTHS, THE FOOD YOU BOUGHT JUST DIDN'T LAST AND YOU DIDN'T HAVE MONEY TO GET MORE.: SOMETIMES TRUE

## 2024-07-08 ASSESSMENT — COGNITIVE AND FUNCTIONAL STATUS - GENERAL: LEVEL_OF_CONSCIOUSNESS_CALCULATED: ALERT

## 2024-07-08 ASSESSMENT — LIFESTYLE VARIABLES: HOW OFTEN DO YOU HAVE A DRINK CONTAINING ALCOHOL: NEVER

## 2024-11-23 ENCOUNTER — HOSPITAL ENCOUNTER (EMERGENCY)
Age: 29
Discharge: HOME | End: 2024-11-23
Attending: EMERGENCY MEDICINE

## 2024-11-23 VITALS
HEART RATE: 99 BPM | SYSTOLIC BLOOD PRESSURE: 131 MMHG | TEMPERATURE: 98.2 F | DIASTOLIC BLOOD PRESSURE: 84 MMHG | OXYGEN SATURATION: 99 % | BODY MASS INDEX: 44.47 KG/M2 | RESPIRATION RATE: 18 BRPM | WEIGHT: 283.95 LBS

## 2024-11-23 DIAGNOSIS — T50.905A ADVERSE EFFECT OF DRUG, INITIAL ENCOUNTER: ICD-10-CM

## 2024-11-23 DIAGNOSIS — F41.9 ANXIETY: Primary | ICD-10-CM

## 2024-11-23 LAB
RAINBOW EXTRA TUBES HOLD SPECIMEN: NORMAL

## 2024-11-23 PROCEDURE — 10002800 HB RX 250 W HCPCS: Performed by: EMERGENCY MEDICINE

## 2024-11-23 RX ORDER — LORAZEPAM 2 MG/ML
1 INJECTION INTRAMUSCULAR ONCE
Status: COMPLETED | OUTPATIENT
Start: 2024-11-23 | End: 2024-11-23

## 2024-11-23 RX ADMIN — LORAZEPAM 1 MG: 2 INJECTION INTRAMUSCULAR; INTRAVENOUS at 01:43

## 2024-11-23 ASSESSMENT — ENCOUNTER SYMPTOMS: SORE THROAT: 1

## 2024-12-10 ENCOUNTER — APPOINTMENT (OUTPATIENT)
Dept: GENERAL RADIOLOGY | Age: 29
End: 2024-12-10
Attending: STUDENT IN AN ORGANIZED HEALTH CARE EDUCATION/TRAINING PROGRAM

## 2024-12-10 ENCOUNTER — HOSPITAL ENCOUNTER (EMERGENCY)
Age: 29
Discharge: HOME | End: 2024-12-10
Attending: STUDENT IN AN ORGANIZED HEALTH CARE EDUCATION/TRAINING PROGRAM

## 2024-12-10 VITALS
HEART RATE: 82 BPM | DIASTOLIC BLOOD PRESSURE: 86 MMHG | TEMPERATURE: 97.7 F | RESPIRATION RATE: 15 BRPM | SYSTOLIC BLOOD PRESSURE: 136 MMHG | OXYGEN SATURATION: 100 %

## 2024-12-10 DIAGNOSIS — R09.A2 GLOBUS SENSATION: ICD-10-CM

## 2024-12-10 DIAGNOSIS — J02.9 SORE THROAT: Primary | ICD-10-CM

## 2024-12-10 LAB
FLUAV RNA RESP QL NAA+PROBE: NOT DETECTED
FLUBV RNA RESP QL NAA+PROBE: NOT DETECTED
RSV AG NPH QL IA.RAPID: NOT DETECTED
S PYO DNA THROAT QL NAA+PROBE: NOT DETECTED
SARS-COV-2 RNA RESP QL NAA+PROBE: NOT DETECTED
SERVICE CMNT-IMP: NORMAL
SERVICE CMNT-IMP: NORMAL

## 2024-12-10 PROCEDURE — 87651 STREP A DNA AMP PROBE: CPT | Performed by: EMERGENCY MEDICINE

## 2024-12-10 PROCEDURE — 99283 EMERGENCY DEPT VISIT LOW MDM: CPT

## 2024-12-10 PROCEDURE — 0241U COVID/FLU/RSV PANEL: CPT | Performed by: EMERGENCY MEDICINE

## 2024-12-10 PROCEDURE — 10002801 HB RX 250 W/O HCPCS: Performed by: STUDENT IN AN ORGANIZED HEALTH CARE EDUCATION/TRAINING PROGRAM

## 2024-12-10 PROCEDURE — 10002803 HB RX 637: Performed by: STUDENT IN AN ORGANIZED HEALTH CARE EDUCATION/TRAINING PROGRAM

## 2024-12-10 PROCEDURE — 71045 X-RAY EXAM CHEST 1 VIEW: CPT

## 2024-12-10 PROCEDURE — 94640 AIRWAY INHALATION TREATMENT: CPT

## 2024-12-10 RX ORDER — PREDNISONE 20 MG/1
60 TABLET ORAL ONCE
Status: COMPLETED | OUTPATIENT
Start: 2024-12-10 | End: 2024-12-10

## 2024-12-10 RX ORDER — IPRATROPIUM BROMIDE AND ALBUTEROL SULFATE 2.5; .5 MG/3ML; MG/3ML
3 SOLUTION RESPIRATORY (INHALATION) ONCE
Status: COMPLETED | OUTPATIENT
Start: 2024-12-10 | End: 2024-12-10

## 2024-12-10 RX ADMIN — Medication 15 ML: at 02:26

## 2024-12-10 RX ADMIN — IPRATROPIUM BROMIDE AND ALBUTEROL SULFATE 3 ML: 2.5; .5 SOLUTION RESPIRATORY (INHALATION) at 02:43

## 2024-12-10 RX ADMIN — PREDNISONE 60 MG: 20 TABLET ORAL at 02:34

## 2024-12-10 SDOH — SOCIAL STABILITY: SOCIAL INSECURITY: HOW OFTEN DOES ANYONE, INCLUDING FAMILY AND FRIENDS, INSULT OR TALK DOWN TO YOU?: NEVER

## 2024-12-10 SDOH — SOCIAL STABILITY: SOCIAL INSECURITY: HOW OFTEN DOES ANYONE, INCLUDING FAMILY AND FRIENDS, SCREAM OR CURSE AT YOU?: NEVER

## 2024-12-10 SDOH — SOCIAL STABILITY: SOCIAL INSECURITY: HOW OFTEN DOES ANYONE, INCLUDING FAMILY AND FRIENDS, PHYSICALLY HURT YOU?: NEVER

## 2024-12-10 SDOH — SOCIAL STABILITY: SOCIAL INSECURITY: HOW OFTEN DOES ANYONE, INCLUDING FAMILY AND FRIENDS, THREATEN YOU WITH HARM?: NEVER

## 2024-12-10 ASSESSMENT — PAIN SCALES - GENERAL: PAINLEVEL_OUTOF10: 1

## 2025-05-07 PROBLEM — F20.9 SCHIZOPHRENIA (HCC): Status: ACTIVE | Noted: 2025-05-07

## 2025-05-07 PROBLEM — F43.10 PTSD (POST-TRAUMATIC STRESS DISORDER): Status: ACTIVE | Noted: 2025-05-07

## 2025-05-07 PROBLEM — F42.9 OCD (OBSESSIVE COMPULSIVE DISORDER): Status: ACTIVE | Noted: 2025-05-07

## 2025-05-07 PROBLEM — F20.9 SCHIZOPHRENIA (HCC): Status: RESOLVED | Noted: 2025-05-07 | Resolved: 2025-05-07

## 2025-05-07 PROBLEM — F39 UNSPECIFIED MOOD (AFFECTIVE) DISORDER: Status: ACTIVE | Noted: 2025-05-07

## 2025-05-07 PROBLEM — F50.819 BINGE EATING DISORDER: Status: ACTIVE | Noted: 2025-05-07

## 2025-05-07 PROBLEM — F90.2 ATTENTION DEFICIT HYPERACTIVITY DISORDER (ADHD), COMBINED TYPE: Status: ACTIVE | Noted: 2025-05-07

## 2025-05-07 PROBLEM — F81.2 LEARNING DISORDER INVOLVING MATHEMATICS: Status: ACTIVE | Noted: 2025-05-07

## 2025-05-13 ENCOUNTER — HOSPITAL ENCOUNTER (OUTPATIENT)
Dept: POSTOP/PACU | Facility: HOSPITAL | Age: 30
Discharge: HOME | End: 2025-05-13
Attending: Other
Payer: COMMERCIAL

## 2025-05-13 ENCOUNTER — ANESTHESIA (OUTPATIENT)
Dept: POSTOP/PACU | Facility: HOSPITAL | Age: 30
End: 2025-05-13
Payer: COMMERCIAL

## 2025-05-13 ENCOUNTER — ANESTHESIA EVENT (OUTPATIENT)
Dept: POSTOP/PACU | Facility: HOSPITAL | Age: 30
End: 2025-05-13
Payer: COMMERCIAL

## 2025-05-13 VITALS
HEART RATE: 93 BPM | SYSTOLIC BLOOD PRESSURE: 124 MMHG | HEIGHT: 67 IN | TEMPERATURE: 99 F | WEIGHT: 253 LBS | OXYGEN SATURATION: 100 % | BODY MASS INDEX: 39.71 KG/M2 | DIASTOLIC BLOOD PRESSURE: 81 MMHG | RESPIRATION RATE: 18 BRPM

## 2025-05-13 DIAGNOSIS — F32.A DEPRESSION, UNSPECIFIED DEPRESSION TYPE: ICD-10-CM

## 2025-05-13 RX ORDER — SODIUM CHLORIDE, SODIUM LACTATE, POTASSIUM CHLORIDE, CALCIUM CHLORIDE 600; 310; 30; 20 MG/100ML; MG/100ML; MG/100ML; MG/100ML
INJECTION, SOLUTION INTRAVENOUS CONTINUOUS
Status: DISCONTINUED | OUTPATIENT
Start: 2025-05-13 | End: 2025-05-15

## 2025-05-13 RX ORDER — CAFFEINE AND SODIUM BENZOATE 125 MG/ML
INJECTION, SOLUTION INTRAMUSCULAR; INTRAVENOUS
Status: COMPLETED
Start: 2025-05-13 | End: 2025-05-13

## 2025-05-13 RX ORDER — ONDANSETRON 2 MG/ML
4 INJECTION INTRAMUSCULAR; INTRAVENOUS ONCE
Status: COMPLETED | OUTPATIENT
Start: 2025-05-13 | End: 2025-05-13

## 2025-05-13 RX ORDER — GLYCOPYRROLATE 0.2 MG/ML
0.2 INJECTION, SOLUTION INTRAMUSCULAR; INTRAVENOUS ONCE
Status: COMPLETED | OUTPATIENT
Start: 2025-05-13 | End: 2025-05-13

## 2025-05-13 RX ORDER — ETOMIDATE 2 MG/ML
INJECTION INTRAVENOUS AS NEEDED
Status: DISCONTINUED | OUTPATIENT
Start: 2025-05-13 | End: 2025-05-13 | Stop reason: SURG

## 2025-05-13 RX ORDER — KETOROLAC TROMETHAMINE 30 MG/ML
15 INJECTION, SOLUTION INTRAMUSCULAR; INTRAVENOUS ONCE
Status: COMPLETED | OUTPATIENT
Start: 2025-05-13 | End: 2025-05-13

## 2025-05-13 RX ORDER — KETOROLAC TROMETHAMINE 30 MG/ML
INJECTION, SOLUTION INTRAMUSCULAR; INTRAVENOUS
Status: COMPLETED
Start: 2025-05-13 | End: 2025-05-13

## 2025-05-13 RX ORDER — ONDANSETRON 2 MG/ML
INJECTION INTRAMUSCULAR; INTRAVENOUS
Status: COMPLETED
Start: 2025-05-13 | End: 2025-05-13

## 2025-05-13 RX ORDER — CAFFEINE AND SODIUM BENZOATE 125 MG/ML
250 INJECTION, SOLUTION INTRAMUSCULAR; INTRAVENOUS ONCE
Status: COMPLETED | OUTPATIENT
Start: 2025-05-13 | End: 2025-05-13

## 2025-05-13 RX ORDER — GLYCOPYRROLATE 0.2 MG/ML
INJECTION, SOLUTION INTRAMUSCULAR; INTRAVENOUS
Status: COMPLETED
Start: 2025-05-13 | End: 2025-05-13

## 2025-05-13 RX ORDER — KETAMINE HYDROCHLORIDE 50 MG/ML
INJECTION, SOLUTION INTRAMUSCULAR; INTRAVENOUS AS NEEDED
Status: DISCONTINUED | OUTPATIENT
Start: 2025-05-13 | End: 2025-05-13 | Stop reason: SURG

## 2025-05-13 RX ADMIN — KETAMINE HYDROCHLORIDE 50 MG: 50 INJECTION, SOLUTION INTRAMUSCULAR; INTRAVENOUS at 07:02:00

## 2025-05-13 RX ADMIN — GLYCOPYRROLATE 0.2 MG: 0.2 INJECTION, SOLUTION INTRAMUSCULAR; INTRAVENOUS at 06:49:00

## 2025-05-13 RX ADMIN — SODIUM CHLORIDE, SODIUM LACTATE, POTASSIUM CHLORIDE, CALCIUM CHLORIDE: 600; 310; 30; 20 INJECTION, SOLUTION INTRAVENOUS at 06:22:00

## 2025-05-13 RX ADMIN — ONDANSETRON 4 MG: 2 INJECTION INTRAMUSCULAR; INTRAVENOUS at 06:23:00

## 2025-05-13 RX ADMIN — KETOROLAC TROMETHAMINE 15 MG: 30 INJECTION, SOLUTION INTRAMUSCULAR; INTRAVENOUS at 06:26:00

## 2025-05-13 RX ADMIN — ETOMIDATE 14 MG: 2 INJECTION INTRAVENOUS at 07:02:00

## 2025-05-13 RX ADMIN — CAFFEINE AND SODIUM BENZOATE 250 MG: 125 INJECTION, SOLUTION INTRAMUSCULAR; INTRAVENOUS at 06:50:00

## 2025-05-13 NOTE — ANESTHESIA PREPROCEDURE EVALUATION
PRE-OP EVALUATION    Patient Name: Zeus Polk    Admit Diagnosis: Depression, unspecified depression type [F32.A]    Pre-op Diagnosis: * No pre-op diagnosis entered *        Anesthesia Procedure: ECT(BEDSIDE)    * No surgeons found in log *    Pre-op vitals reviewed.  Temp: 98.2 °F (36.8 °C)  Pulse: 85  Resp: 25  BP: 123/79  SpO2: 100 %  Body mass index is 39.63 kg/m².    Current medications reviewed.  Hospital Medications:  Current Medications[1]    Outpatient Medications:   Prescriptions Prior to Admission[2]    Allergies: Seasonal      Anesthesia Evaluation    Patient summary reviewed.    Anesthetic Complications           GI/Hepatic/Renal    Negative GI/hepatic/renal ROS.                             Cardiovascular                  (+) hypertension                                     Endo/Other    Negative endo/other ROS.                              Pulmonary    Negative pulmonary ROS.                       Neuro/Psych      (+) depression                        Patient Active Problem List:     Asymmetric tonsils     Unspecified mood (affective) disorder     OCD (obsessive compulsive disorder)     a     Attention deficit hyperactivity disorder (ADHD), combined type     Learning disorder involving mathematics     Binge eating disorder           Past Surgical History[3]  Social Hx on file[4]  History   Drug Use No     Available pre-op labs reviewed.  Lab Results   Component Value Date    WBC 7.3 05/06/2025    RBC 4.54 05/06/2025    HGB 13.5 05/06/2025    HCT 38.5 05/06/2025    MCV 84.8 05/06/2025    MCH 29.7 05/06/2025    MCHC 35.1 05/06/2025    RDW 13.0 05/06/2025    .0 05/06/2025     Lab Results   Component Value Date     05/08/2025    K 4.1 05/08/2025     05/08/2025    CO2 27.0 05/08/2025    BUN 13 05/08/2025    CREATSERUM 1.22 (H) 05/08/2025    GLU 91 05/08/2025    CA 9.1 05/08/2025            Airway      Mallampati: II  Mouth opening: >3 FB  TM distance: > 6 cm  Neck ROM: full  Cardiovascular    Cardiovascular exam normal.         Dental    Dentition appears grossly intact         Pulmonary    Pulmonary exam normal.                 Other findings              ASA: 2   Plan: general  NPO status verified and patient meets guidelines.        Comment:  I explained intrinsic risks of general anesthesia, including nausea, dental damage, sore throat, mouth injury,and hoarseness from airway management.  All questions were answered and understanding was demonstrated of risks.  Informed permission was obtained to proceed as documented in the signed consent form.         Plan/risks discussed with: patient                Present on Admission:  **None**             [1]    lactated ringers infusion   Intravenous Continuous    glycopyrrolate (Robinul) 0.2 MG/ML injection 0.2 mg  0.2 mg Intravenous Once    [COMPLETED] ketorolac (Toradol) 30 MG/ML injection 15 mg  15 mg Intravenous Once    [COMPLETED] ondansetron (Zofran) 4 MG/2ML injection 4 mg  4 mg Intravenous Once    caffeine-sodium benzoate 125-125 mg/mL injection  250 mg Intravenous Once    [COMPLETED] glycopyrrolate (Robinul) 0.2 MG/ML injection        [COMPLETED] caffeine-sodium benzoate 125-125 MG/ML injection        [COMPLETED] acetaminophen (Tylenol Extra Strength) tab 1,000 mg  1,000 mg Oral See Admin Instructions    magnesium hydroxide (Milk of Magnesia) 400 MG/5ML oral suspension 30 mL  30 mL Oral Daily PRN    guaiFENesin (Robitussin) 100 MG/5 ML oral liquid 200 mg  200 mg Oral Q4H PRN    QUEtiapine (SEROquel) tab 300 mg  300 mg Oral Nightly    QUEtiapine (SEROquel) tab 25 mg  25 mg Oral Daily PRN    alum-mag hydroxide-simethicone (Maalox) 200-200-20 MG/5ML oral suspension 30 mL  30 mL Oral Q4H PRN    acetaminophen (Tylenol) tab 325 mg  325 mg Oral Q4H PRN    Or    acetaminophen (Tylenol) tab 650 mg  650 mg Oral Q4H PRN    haloperidol (Haldol) tab 5 mg  5 mg Oral Q4H PRN    Or    haloperidol lactate (Haldol) 5 MG/ML injection 5 mg  5 mg  Intramuscular Q4H PRN    benztropine (Cogentin) tab 2 mg  2 mg Oral Q4H PRN    Or    benztropine mesylate (Cogentin) 1 mg/mL injection 2 mg  2 mg Intramuscular Q4H PRN    LORazepam (Ativan) tab 2 mg  2 mg Oral Q4H PRN    Or    LORazepam (Ativan) 2 mg/mL injection 2 mg  2 mg Intramuscular Q4H PRN    albuterol (Ventolin HFA) 108 (90 Base) MCG/ACT inhaler 2 puff  2 puff Inhalation Q6H PRN    amLODIPine (Norvasc) tab 5 mg  5 mg Oral Daily    cetirizine (ZyrTEC) tab 10 mg  10 mg Oral Nightly   [2] (Not in a hospital admission)  [3]   Past Surgical History:  Procedure Laterality Date    Oral surgery     [4]   Social History  Socioeconomic History    Marital status: Single   Tobacco Use    Smoking status: Never    Smokeless tobacco: Never   Vaping Use    Vaping status: Never Used   Substance and Sexual Activity    Alcohol use: Yes     Comment: social drinking    Drug use: No

## 2025-05-13 NOTE — PROGRESS NOTES
[facility] / [facility]  ECT History & Physical    Zeus Polk Patient Status:  Outpatient   Age/Gender 29 year old female MRN [id number]   Location [facility] POST ANESTHESIA CARE UNIT Attending Estuardo Correa MD   Hosp Day # 0 PCP Lucrecia Crain     Date of Service: 5/13/2025    Diagnosis:  Depression, Unspecified Type. F32.9    Procedure:  Bifrontal    HPI: Patient is depressed and anxious with intrusive thoughts and passive suicidal thoughts.      Medical History:  Past Medical History[1]    Surgical History:  Past Surgical History[2]    Family History:  Family History[3]    Social History:  Social History     Socioeconomic History    Marital status: Single     Spouse name: Not on file    Number of children: Not on file    Years of education: Not on file    Highest education level: Not on file   Occupational History    Not on file   Tobacco Use    Smoking status: Never    Smokeless tobacco: Never   Vaping Use    Vaping status: Never Used   Substance and Sexual Activity    Alcohol use: Yes     Comment: social drinking    Drug use: No    Sexual activity: Not on file   Other Topics Concern    Caffeine Concern Not Asked    Exercise Not Asked    Seat Belt Not Asked    Special Diet Not Asked    Stress Concern Not Asked    Weight Concern Not Asked   Social History Narrative    Not on file     Social Drivers of Health     Food Insecurity: No Food Insecurity (5/8/2025)    NCSS - Food Insecurity     Worried About Running Out of Food in the Last Year: No     Ran Out of Food in the Last Year: No   Transportation Needs: No Transportation Needs (5/8/2025)    NCSS - Transportation     Lack of Transportation: No   Housing Stability: Unknown (5/8/2025)    NCSS - Housing/Utilities     Has Housing: Yes     Worried About Losing Housing: No     Unable to Get Utilities: Not on file       ROS:  unremarkable    Physical Exam:   /79 (BP Location: Right arm)   Pulse 85   Temp 98.2 °F (36.8 °C) (Temporal)   Resp  25   Ht 67\"   Wt 114.8 kg (253 lb)   LMP 04/08/2025   SpO2 100%   BMI 39.63 kg/m²     General Appearance:    Alert, cooperative, no distress, appears stated age   Head:    Normocephalic, without obvious abnormality, atraumatic   Eyes:    PERRL, conjunctiva/corneas clear, EOM's intact   Nose:   Nares normal, septum midline, mucosa normal, no drainage    or sinus tenderness   Throat:   Lips, mucosa, and tongue normal; teeth and gums normal   Neck:   Supple, symmetrical, trachea midline   Lungs:     Clear to auscultation bilaterally, respirations unlabored    Heart:    Regular rate and rhythm, S1 and S2 normal, no murmur, rub or gallop   Abdomen:     Soft, non-tender, bowel sounds active all four quadrants,     no masses, no organomegaly   Extremities:   Extremities normal, atraumatic, no cyanosis or edema   Pulses:   2+ and symmetric all extremities   Skin:   Skin color, texture, turgor normal, no rashes or lesions   Neurologic:   CNII-XII intact, normal strength, sensation and reflexes     throughout     Impressions & Plans:    Diagnosis:  Depression, Unspecified Type. F32.9    Procedure:  Bifrontal    I have discussed the risks and benefits and alternatives with the patient/family.  They understand and agree to proceed with plan of care.    Kath Tineo MD  5/13/2025         [1]   Past Medical History:   ADHD    Anxiety    Bipolar disorder (HCC)    Depression    Essential hypertension    High blood pressure    OCD (obsessive compulsive disorder)    Schizophrenia (HCC)   [2]   Past Surgical History:  Procedure Laterality Date    Oral surgery     [3]   Family History  Family history unknown: Yes

## 2025-05-13 NOTE — ANESTHESIA POSTPROCEDURE EVALUATION
[facility]    Zeus Polk Patient Status:  Outpatient   Age/Gender 29 year old female MRN [id number]   Location [facility] POST ANESTHESIA CARE UNIT Attending Estuardo Correa MD   Hosp Day # 0 PCP Lucrecia Crain       Anesthesia Post-op Note        Procedure Summary       Date: 05/13/25 Room / Location: [facility] Post Anesthesia Care Unit    Anesthesia Start: 0657 Anesthesia Stop: 0713    Procedure: ECT(BEDSIDE) Diagnosis: Depression, unspecified depression type    Scheduled Providers:  Anesthesiologist: Stanley Duenas MD    Anesthesia Type: general ASA Status: 2            Anesthesia Type: general    Vitals Value Taken Time   /90 05/13/25 07:29   Temp  05/13/25 07:30   Pulse 102 05/13/25 07:30   Resp 24 05/13/25 07:30   SpO2 90 % 05/13/25 07:30   Vitals shown include unfiled device data.        Patient Location: PACU    Anesthesia Type: general    Airway Patency: patent    Postop Pain Control: adequate    Mental Status: mildly sedated but able to meaningfully participate in the post-anesthesia evaluation    Nausea/Vomiting: none    Cardiopulmonary/Hydration status: stable euvolemic    Complications: no apparent anesthesia related complications    Postop vital signs: stable    Dental Exam: Unchanged from Preop    Patient to be discharged from PACU when criteria met.

## 2025-05-13 NOTE — PROGRESS NOTES
[facility] / [facility]  ECT Procedure Note    Zeus Polk Patient Status:  Outpatient   Age/Gender 29 year old female   MRN [id number]    Location [facility] POST ANESTHESIA CARE UNIT Attending No att. providers found   Hosp Day # 0 PCP Lucrecia Crain     ECT Number: Admit #1    Diagnosis: Depression, Unspecified Type. F32.9    Type of ECT:  Bifrontal    Place of Service:  Inpatient    Settings:   1.  Energy Percentage: 60%    2.  Program:  Low 0.5    Pre-ECT Evaluation    Symptoms:      Prior to procedure, reviewed with treatment team correct patient, time of procedure and type of ECT.  Also reviewed with anesthesia pre-ECT medications    Pt is a 30 yo female with chronic depression, anxiety, OCD, borderline personality features who was admitted due to worsening depression, suicidal thoughts, and because she wishes to pursue ECT at [facility].  Patient's symptoms remain the same since admission with severe depressed mood, high anxiety, intrusive thoughts, and passive suicidal thoughts.  Patient with history of trials of numerous medications and medication combinations with either poor results or tolerability.    The patient continues to retain capacity to consent for ECT.    Risk/Benefits:  Discussed with patient side effects of ECT including headache, teeth, jaw, cardiac, pulmonary, NPO, aspiration, allergic reactions, anesthetic reactions, musculoskeletal, neurologic, morbidity/mortality, potential lack of efficacy, unilateral/bilateral ECT, relapse/maintenance issues, cognitive risks including memory, concentration, cognition, and other risks.    Side Effects:  N/A    Exam:  Mood: depressed  Affect: Congruent and Blunted  Memory:  intact immediate, recent, remote and as evidenced by ability to present consistent history  Concentration:   focused and attentive and as assessed by  ability to concentrate on our conversation  Suicidal ideation: suicidal ideation but has positive safety  plan    Patient Monitored:  B/P, EKG, EEG, Pulse Ox, Left Ankle Cuff    Pre-ECT Medications:  Robinul 0.2 mg IV, Toradol 15 mg IV, and Zofran 4 mg IV, caffeine 250 mg IV    ECT Medications:  Anesthetic  Etomidate 14 mg IV and Ketamine 50 mg IV and Succinylcholine 90 mg IV    Seizure Duration:  Motor: 59 seconds       EE seconds    Post-ECT Condition:  Treatment unremarkable    ECT Medications: Propofol 30 milligrams IV    Kath Tineo    2025

## 2025-05-15 ENCOUNTER — ANESTHESIA EVENT (OUTPATIENT)
Dept: POSTOP/PACU | Facility: HOSPITAL | Age: 30
End: 2025-05-15
Payer: COMMERCIAL

## 2025-05-15 ENCOUNTER — ANESTHESIA (OUTPATIENT)
Dept: POSTOP/PACU | Facility: HOSPITAL | Age: 30
End: 2025-05-15
Payer: COMMERCIAL

## 2025-05-15 RX ORDER — KETAMINE HYDROCHLORIDE 50 MG/ML
INJECTION, SOLUTION INTRAMUSCULAR; INTRAVENOUS AS NEEDED
Status: DISCONTINUED | OUTPATIENT
Start: 2025-05-15 | End: 2025-05-15 | Stop reason: SURG

## 2025-05-15 RX ORDER — ETOMIDATE 2 MG/ML
INJECTION INTRAVENOUS AS NEEDED
Status: DISCONTINUED | OUTPATIENT
Start: 2025-05-15 | End: 2025-05-15 | Stop reason: SURG

## 2025-05-15 RX ADMIN — ETOMIDATE 14 MG: 2 INJECTION INTRAVENOUS at 07:51:00

## 2025-05-15 RX ADMIN — KETAMINE HYDROCHLORIDE 50 MG: 50 INJECTION, SOLUTION INTRAMUSCULAR; INTRAVENOUS at 07:51:00

## 2025-05-15 NOTE — ANESTHESIA PREPROCEDURE EVALUATION
PRE-OP EVALUATION    Patient Name: Zeus Polk    Admit Diagnosis: Depression, unspecified depression type [F32.A]    Pre-op Diagnosis: * No pre-op diagnosis entered *        Anesthesia Procedure: ECT(BEDSIDE)    * No surgeons found in log *    Pre-op vitals reviewed.        There is no height or weight on file to calculate BMI.    Current medications reviewed.  Hospital Medications:  Current Medications[1]    Outpatient Medications:   Prescriptions Prior to Admission[2]    Allergies: Seasonal      Anesthesia Evaluation    Patient summary reviewed.    Anesthetic Complications           GI/Hepatic/Renal    Negative GI/hepatic/renal ROS.                             Cardiovascular                  (+) hypertension                                     Endo/Other    Negative endo/other ROS.                              Pulmonary    Negative pulmonary ROS.                       Neuro/Psych      (+) depression                        Patient Active Problem List:     Asymmetric tonsils     Unspecified mood (affective) disorder     OCD (obsessive compulsive disorder)     a     Attention deficit hyperactivity disorder (ADHD), combined type     Learning disorder involving mathematics     Binge eating disorder           Past Surgical History[3]  Social Hx on file[4]  History   Drug Use No     Available pre-op labs reviewed.  Lab Results   Component Value Date    WBC 7.3 05/06/2025    RBC 4.54 05/06/2025    HGB 13.5 05/06/2025    HCT 38.5 05/06/2025    MCV 84.8 05/06/2025    MCH 29.7 05/06/2025    MCHC 35.1 05/06/2025    RDW 13.0 05/06/2025    .0 05/06/2025     Lab Results   Component Value Date     05/08/2025    K 4.1 05/08/2025     05/08/2025    CO2 27.0 05/08/2025    BUN 13 05/08/2025    CREATSERUM 1.22 (H) 05/08/2025    GLU 91 05/08/2025    CA 9.1 05/08/2025            Airway      Mallampati: II  Mouth opening: >3 FB  TM distance: > 6 cm  Neck ROM: full Cardiovascular    Cardiovascular exam normal.          Dental    Dentition appears grossly intact         Pulmonary    Pulmonary exam normal.                 Other findings              ASA: 2   Plan: general  NPO status verified and patient meets guidelines.        Comment:  I explained intrinsic risks of general anesthesia, including nausea, dental damage, sore throat, mouth injury,and hoarseness from airway management.  All questions were answered and understanding was demonstrated of risks.  Informed permission was obtained to proceed as documented in the signed consent form.         Plan/risks discussed with: patient                Present on Admission:  **None**             [1]    lactated ringers infusion   Intravenous Continuous    [COMPLETED] glycopyrrolate (Robinul) 0.2 MG/ML injection 0.2 mg  0.2 mg Intravenous Once    [COMPLETED] ketorolac (Toradol) 30 MG/ML injection 15 mg  15 mg Intravenous Once    [COMPLETED] ondansetron (Zofran) 4 MG/2ML injection 4 mg  4 mg Intravenous Once    [COMPLETED] caffeine-sodium benzoate 125-125 mg/mL injection  250 mg Intravenous Once    [COMPLETED] ondansetron (Zofran) 4 MG/2ML injection        [COMPLETED] ketorolac (Toradol) 30 MG/ML injection        [COMPLETED] glycopyrrolate (Robinul) 0.2 MG/ML injection        [COMPLETED] caffeine-sodium benzoate 125-125 MG/ML injection        [COMPLETED] ketorolac (Toradol) 30 MG/ML injection        [COMPLETED] ketorolac (Toradol) 30 MG/ML injection 15 mg  15 mg Intravenous Once    acetaminophen (Tylenol Extra Strength) tab 1,000 mg  1,000 mg Oral Daily PRN    magnesium hydroxide (Milk of Magnesia) 400 MG/5ML oral suspension 30 mL  30 mL Oral Daily PRN    guaiFENesin (Robitussin) 100 MG/5 ML oral liquid 200 mg  200 mg Oral Q4H PRN    QUEtiapine (SEROquel) tab 300 mg  300 mg Oral Nightly    QUEtiapine (SEROquel) tab 25 mg  25 mg Oral Daily PRN    alum-mag hydroxide-simethicone (Maalox) 200-200-20 MG/5ML oral suspension 30 mL  30 mL Oral Q4H PRN    acetaminophen (Tylenol) tab 325 mg  325  mg Oral Q4H PRN    Or    acetaminophen (Tylenol) tab 650 mg  650 mg Oral Q4H PRN    haloperidol (Haldol) tab 5 mg  5 mg Oral Q4H PRN    Or    haloperidol lactate (Haldol) 5 MG/ML injection 5 mg  5 mg Intramuscular Q4H PRN    benztropine (Cogentin) tab 2 mg  2 mg Oral Q4H PRN    Or    benztropine mesylate (Cogentin) 1 mg/mL injection 2 mg  2 mg Intramuscular Q4H PRN    LORazepam (Ativan) tab 2 mg  2 mg Oral Q4H PRN    Or    LORazepam (Ativan) 2 mg/mL injection 2 mg  2 mg Intramuscular Q4H PRN    albuterol (Ventolin HFA) 108 (90 Base) MCG/ACT inhaler 2 puff  2 puff Inhalation Q6H PRN    amLODIPine (Norvasc) tab 5 mg  5 mg Oral Daily    cetirizine (ZyrTEC) tab 10 mg  10 mg Oral Nightly   [2] (Not in a hospital admission)   [3]   Past Surgical History:  Procedure Laterality Date    Oral surgery     [4]   Social History  Socioeconomic History    Marital status: Single   Tobacco Use    Smoking status: Never    Smokeless tobacco: Never   Vaping Use    Vaping status: Never Used   Substance and Sexual Activity    Alcohol use: Yes     Comment: social drinking    Drug use: No

## 2025-05-15 NOTE — ANESTHESIA POSTPROCEDURE EVALUATION
[facility]    Zeus Polk Patient Status:  Outpatient   Age/Gender 29 year old female MRN [id number]   Location [facility] POST ANESTHESIA CARE UNIT Attending Estuardo Corera MD   Hosp Day # 0 PCP Lucrecia Crain       Anesthesia Post-op Note        Procedure Summary       Date: 05/15/25 Room / Location: [facility] Post Anesthesia Care Unit    Anesthesia Start: 0750 Anesthesia Stop: 0802    Procedure: ECT(BEDSIDE) Diagnosis: Depression, unspecified depression type    Scheduled Providers:  Anesthesiologist: Karthik Bello MD    Anesthesia Type: general ASA Status: 2            Anesthesia Type: general      5/15/2025     8:35 AM 5/15/2025     8:40 AM   Vitals History   /84    Pulse 85 86   Resp 19 19   SpO2 97 % 96 %              Patient Location: PACU    Anesthesia Type: general    Airway Patency: patent    Postop Pain Control: adequate    Mental Status: mildly sedated but able to meaningfully participate in the post-anesthesia evaluation    Nausea/Vomiting: none    Cardiopulmonary/Hydration status: stable euvolemic    Complications: no apparent anesthesia related complications    Postop vital signs: stable    Dental Exam: Unchanged from Preop    Patient to be discharged from PACU when criteria met.

## 2025-05-19 ENCOUNTER — HOSPITAL ENCOUNTER (OUTPATIENT)
Dept: POSTOP/PACU | Facility: HOSPITAL | Age: 30
Discharge: HOME | End: 2025-05-19
Attending: Other
Payer: COMMERCIAL

## 2025-05-19 ENCOUNTER — ANESTHESIA EVENT (OUTPATIENT)
Dept: POSTOP/PACU | Facility: HOSPITAL | Age: 30
End: 2025-05-19
Payer: COMMERCIAL

## 2025-05-19 ENCOUNTER — ANESTHESIA (OUTPATIENT)
Dept: POSTOP/PACU | Facility: HOSPITAL | Age: 30
End: 2025-05-19
Payer: COMMERCIAL

## 2025-05-19 VITALS
HEIGHT: 67 IN | HEART RATE: 97 BPM | TEMPERATURE: 98 F | WEIGHT: 255.63 LBS | DIASTOLIC BLOOD PRESSURE: 84 MMHG | SYSTOLIC BLOOD PRESSURE: 130 MMHG | RESPIRATION RATE: 12 BRPM | BODY MASS INDEX: 40.12 KG/M2 | OXYGEN SATURATION: 100 %

## 2025-05-19 DIAGNOSIS — F39 UNSPECIFIED MOOD (AFFECTIVE) DISORDER: ICD-10-CM

## 2025-05-19 RX ORDER — ETOMIDATE 2 MG/ML
INJECTION INTRAVENOUS AS NEEDED
Status: DISCONTINUED | OUTPATIENT
Start: 2025-05-19 | End: 2025-05-19 | Stop reason: SURG

## 2025-05-19 RX ORDER — KETOROLAC TROMETHAMINE 30 MG/ML
30 INJECTION, SOLUTION INTRAMUSCULAR; INTRAVENOUS ONCE
Status: COMPLETED | OUTPATIENT
Start: 2025-05-19 | End: 2025-05-19

## 2025-05-19 RX ORDER — KETAMINE HYDROCHLORIDE 50 MG/ML
INJECTION, SOLUTION INTRAMUSCULAR; INTRAVENOUS AS NEEDED
Status: DISCONTINUED | OUTPATIENT
Start: 2025-05-19 | End: 2025-05-19 | Stop reason: SURG

## 2025-05-19 RX ORDER — HYDROMORPHONE HYDROCHLORIDE 1 MG/ML
0.6 INJECTION, SOLUTION INTRAMUSCULAR; INTRAVENOUS; SUBCUTANEOUS EVERY 5 MIN PRN
Status: DISCONTINUED | OUTPATIENT
Start: 2025-05-19 | End: 2025-05-19 | Stop reason: HOSPADM

## 2025-05-19 RX ORDER — CAFFEINE AND SODIUM BENZOATE 125 MG/ML
250 INJECTION, SOLUTION INTRAMUSCULAR; INTRAVENOUS ONCE
Status: COMPLETED | OUTPATIENT
Start: 2025-05-19 | End: 2025-05-19

## 2025-05-19 RX ORDER — SODIUM CHLORIDE, SODIUM LACTATE, POTASSIUM CHLORIDE, CALCIUM CHLORIDE 600; 310; 30; 20 MG/100ML; MG/100ML; MG/100ML; MG/100ML
INJECTION, SOLUTION INTRAVENOUS CONTINUOUS
Status: DISCONTINUED | OUTPATIENT
Start: 2025-05-19 | End: 2025-05-21

## 2025-05-19 RX ORDER — HYDROMORPHONE HYDROCHLORIDE 1 MG/ML
0.2 INJECTION, SOLUTION INTRAMUSCULAR; INTRAVENOUS; SUBCUTANEOUS EVERY 5 MIN PRN
Status: DISCONTINUED | OUTPATIENT
Start: 2025-05-19 | End: 2025-05-19 | Stop reason: HOSPADM

## 2025-05-19 RX ORDER — ONDANSETRON 2 MG/ML
4 INJECTION INTRAMUSCULAR; INTRAVENOUS ONCE
Status: COMPLETED | OUTPATIENT
Start: 2025-05-19 | End: 2025-05-19

## 2025-05-19 RX ORDER — CAFFEINE AND SODIUM BENZOATE 125 MG/ML
INJECTION, SOLUTION INTRAMUSCULAR; INTRAVENOUS
Status: COMPLETED
Start: 2025-05-19 | End: 2025-05-19

## 2025-05-19 RX ORDER — GLYCOPYRROLATE 0.2 MG/ML
0.2 INJECTION, SOLUTION INTRAMUSCULAR; INTRAVENOUS ONCE
Status: COMPLETED | OUTPATIENT
Start: 2025-05-19 | End: 2025-05-19

## 2025-05-19 RX ORDER — KETOROLAC TROMETHAMINE 30 MG/ML
INJECTION, SOLUTION INTRAMUSCULAR; INTRAVENOUS
Status: COMPLETED
Start: 2025-05-19 | End: 2025-05-19

## 2025-05-19 RX ORDER — NALOXONE HYDROCHLORIDE 0.4 MG/ML
0.08 INJECTION, SOLUTION INTRAMUSCULAR; INTRAVENOUS; SUBCUTANEOUS AS NEEDED
Status: DISCONTINUED | OUTPATIENT
Start: 2025-05-19 | End: 2025-05-19 | Stop reason: HOSPADM

## 2025-05-19 RX ORDER — ONDANSETRON 2 MG/ML
INJECTION INTRAMUSCULAR; INTRAVENOUS
Status: COMPLETED
Start: 2025-05-19 | End: 2025-05-19

## 2025-05-19 RX ORDER — GLYCOPYRROLATE 0.2 MG/ML
INJECTION, SOLUTION INTRAMUSCULAR; INTRAVENOUS
Status: COMPLETED
Start: 2025-05-19 | End: 2025-05-19

## 2025-05-19 RX ORDER — SODIUM CHLORIDE, SODIUM LACTATE, POTASSIUM CHLORIDE, CALCIUM CHLORIDE 600; 310; 30; 20 MG/100ML; MG/100ML; MG/100ML; MG/100ML
INJECTION, SOLUTION INTRAVENOUS CONTINUOUS
Status: DISCONTINUED | OUTPATIENT
Start: 2025-05-19 | End: 2025-05-19 | Stop reason: HOSPADM

## 2025-05-19 RX ORDER — HYDROMORPHONE HYDROCHLORIDE 1 MG/ML
0.4 INJECTION, SOLUTION INTRAMUSCULAR; INTRAVENOUS; SUBCUTANEOUS EVERY 5 MIN PRN
Status: DISCONTINUED | OUTPATIENT
Start: 2025-05-19 | End: 2025-05-19 | Stop reason: HOSPADM

## 2025-05-19 RX ADMIN — ONDANSETRON 4 MG: 2 INJECTION INTRAMUSCULAR; INTRAVENOUS at 06:08:00

## 2025-05-19 RX ADMIN — ETOMIDATE 14 MG: 2 INJECTION INTRAVENOUS at 06:54:00

## 2025-05-19 RX ADMIN — SODIUM CHLORIDE, SODIUM LACTATE, POTASSIUM CHLORIDE, CALCIUM CHLORIDE: 600; 310; 30; 20 INJECTION, SOLUTION INTRAVENOUS at 07:05:00

## 2025-05-19 RX ADMIN — SODIUM CHLORIDE, SODIUM LACTATE, POTASSIUM CHLORIDE, CALCIUM CHLORIDE: 600; 310; 30; 20 INJECTION, SOLUTION INTRAVENOUS at 06:08:00

## 2025-05-19 RX ADMIN — GLYCOPYRROLATE 0.2 MG: 0.2 INJECTION, SOLUTION INTRAMUSCULAR; INTRAVENOUS at 06:13:00

## 2025-05-19 RX ADMIN — CAFFEINE AND SODIUM BENZOATE 250 MG: 125 INJECTION, SOLUTION INTRAMUSCULAR; INTRAVENOUS at 06:14:00

## 2025-05-19 RX ADMIN — KETOROLAC TROMETHAMINE 30 MG: 30 INJECTION, SOLUTION INTRAMUSCULAR; INTRAVENOUS at 06:11:00

## 2025-05-19 RX ADMIN — KETAMINE HYDROCHLORIDE 50 MG: 50 INJECTION, SOLUTION INTRAMUSCULAR; INTRAVENOUS at 06:54:00

## 2025-05-19 NOTE — ANESTHESIA POSTPROCEDURE EVALUATION
[facility]    Zeus Polk Patient Status:  Outpatient   Age/Gender 29 year old female MRN [id number]   Location [facility] POST ANESTHESIA CARE UNIT Attending Estuardo Correa MD   Hosp Day # 0 PCP Lucrecia Crain       Anesthesia Post-op Note        Procedure Summary       Date: 05/19/25 Room / Location: [facility] Post Anesthesia Care Unit    Anesthesia Start: 0648 Anesthesia Stop:     Procedure: ECT(BEDSIDE) Diagnosis: Unspecified mood (affective) disorder    Scheduled Providers:  Anesthesiologist: Eloisa Charles DO    Anesthesia Type: general ASA Status: 2            Anesthesia Type: general    Vitals Value Taken Time   /93 05/19/25 07:05   Temp  05/19/25 07:05   Pulse 74 05/19/25 07:05   Resp 16 05/19/25 07:05   SpO2 98 05/19/25 07:05           Patient Location: PACU    Anesthesia Type: general    Airway Patency: patent    Postop Pain Control: adequate    Mental Status: mildly sedated but able to meaningfully participate in the post-anesthesia evaluation    Nausea/Vomiting: none    Cardiopulmonary/Hydration status: stable euvolemic    Complications: no apparent anesthesia related complications    Postop vital signs: stable    Dental Exam: Unchanged from Preop    Patient to be discharged from PACU when criteria met.

## 2025-05-19 NOTE — PROGRESS NOTES
[facility]  ECT History & Physical    Zeus Polk Patient Status:  Outpatient   Age/Gender 29 year old female MRN [id number]   Location [facility] POST ANESTHESIA CARE UNIT Attending Etsuardo Correa MD   Hosp Day # 0 PCP Lucrecia Crain     Date of Service: 5/19/2025    Diagnosis:  Major Depression Recurrent Severe Without Psychotic Features. F33.2    Procedure:  Bifrontal    HPI: Depressed with continued intrusive thoughts of suicide and homicide.  No physical complaints      Medical History:  Past Medical History[1]    Surgical History:  Past Surgical History[2]    Family History:  Family History[3]    Social History:  Social History     Socioeconomic History    Marital status: Single     Spouse name: Not on file    Number of children: Not on file    Years of education: Not on file    Highest education level: Not on file   Occupational History    Not on file   Tobacco Use    Smoking status: Never    Smokeless tobacco: Never   Vaping Use    Vaping status: Never Used   Substance and Sexual Activity    Alcohol use: Yes     Comment: social drinking    Drug use: No    Sexual activity: Not on file   Other Topics Concern    Caffeine Concern Not Asked    Exercise Not Asked    Seat Belt Not Asked    Special Diet Not Asked    Stress Concern Not Asked    Weight Concern Not Asked   Social History Narrative    Not on file     Social Drivers of Health     Food Insecurity: No Food Insecurity (5/8/2025)    NCSS - Food Insecurity     Worried About Running Out of Food in the Last Year: No     Ran Out of Food in the Last Year: No   Transportation Needs: No Transportation Needs (5/8/2025)    NCSS - Transportation     Lack of Transportation: No   Housing Stability: Unknown (5/8/2025)    NCSS - Housing/Utilities     Has Housing: Yes     Worried About Losing Housing: No     Unable to Get Utilities: Not on file       ROS:  unremarkable    Physical Exam:   Ht 67\"   LMP 04/08/2025   BMI 40.63 kg/m²      General Appearance:    Alert, cooperative, no distress, appears stated age   Head:    Normocephalic, without obvious abnormality, atraumatic   Eyes:    PERRL, conjunctiva/corneas clear, EOM's intact   Nose:   Nares normal, septum midline, mucosa normal, no drainage    or sinus tenderness   Throat:   Lips, mucosa, and tongue normal; teeth and gums normal   Neck:   Supple, symmetrical, trachea midline   Lungs:     Clear to auscultation bilaterally, respirations unlabored    Heart:    Regular rate and rhythm, S1 and S2 normal, no murmur, rub or gallop   Abdomen:     Soft, non-tender, bowel sounds active all four quadrants,     no masses, no organomegaly   Extremities:   Extremities normal, atraumatic, no cyanosis or edema   Pulses:   2+ and symmetric all extremities   Skin:   Skin color, texture, turgor normal, no rashes or lesions   Neurologic:   CNII-XII intact, normal strength, sensation and reflexes     throughout     Impressions & Plans:    Diagnosis:  Major Depression Recurrent Severe Without Psychotic Features. F33.2    Procedure:  Bifrontal    I have discussed the risks and benefits and alternatives with the patient/family.  They understand and agree to proceed with plan of care.    Kath Tineo MD  5/19/2025         [1]   Past Medical History:   ADHD    Anxiety    Bipolar disorder (HCC)    Depression    Essential hypertension    High blood pressure    OCD (obsessive compulsive disorder)    Schizophrenia (HCC)   [2]   Past Surgical History:  Procedure Laterality Date    Oral surgery     [3]   Family History  Family history unknown: Yes

## 2025-05-19 NOTE — DISCHARGE INSTRUCTIONS
Discharge Instructions  Electroconvulsive Therapy    Activities:  You MUST arrange to have a responsible adult drive you home and have a responsible adult stay with you the rest of the day and overnight.  Do not drive today.  Do not operate any machinery today. Use kitchen equipment with caution.  Rest and take it easy today.  Do not take public transportation without the presence of another responsible adult for 24 hours    Medications:  Resume your regular medications when you get home  The nurse will instruct you not to take any NSAIDS (Advil, Aleve, Motrin, Ibuprofen) before 1 pm today because you were given a certain medication during the procedure.      Diet:  You may resume your regular diet when you get home  Do not drink alcohol for 24 hours    Additional Instructions:  Someone should call you to schedule any upcoming ECT treatments. Call as needed to schedule or cancel your ECT appointments [phone].  If you have any questions or concerns, please call your own psychiatrist.  For your safety, please do not wear make-up to any future ECT appointments.  For any questions regarding your ECT appointment, please call [facility] [phone].  For cancellations after hours, call [phone] and leave a message.    Expected Recovery:  As you awaken, you may experience one or more of the following:  Headache, nausea, temporary confusion, or muscle stiffness.  If these symptoms increase, become severe or are accompanied by a fever of more than 101, please seek medical attention.  The ECT may affect memory.  Many patients report loss of memory for events that occurred in the days, weeks or months surrounding the ECT.  Many of these memories may return, but not always.  Short-term memory may also be affected for months, but this can also be a result of the disorder that you have.

## 2025-05-19 NOTE — PROGRESS NOTES
[facility] / [facility]  ECT Procedure Note    Zeus Polk Patient Status:  Outpatient   Age/Gender 29 year old female   MRN [id number]    Location [facility] POST ANESTHESIA CARE UNIT Attending No att. providers found   Hosp Day # 0 PCP Lucrecia Crain     ECT Number: #[id number]    Diagnosis: Major Depression Recurrent Severe Without Psychotic Features. F33.2    Type of ECT:  Bifrontal    Place of Service:  Inpatient    Settings:   1.  Energy Percentage: 60%    2.  Program:  Low 0.5    Pre-ECT Evaluation    Symptoms:      Prior to procedure, reviewed with treatment team correct patient, time of procedure and type of ECT.  Also reviewed with anesthesia pre-ECT medications    Patient with minimal response to ECT thus far.  Patient felt worse on Thursday and Friday.  She requested doses of Haldol over the weekend for her anxiety, but it triggered akathisia.  Overall, patient's symptoms of depression and hopelessness are about the same with continued intrusive thoughts of suicide and homicide.  No cognitive or physical complaints      The patient continues to retain capacity to consent for ECT.    Risk/Benefits:  Discussed with patient side effects of ECT including headache, teeth, jaw, cardiac, pulmonary, NPO, aspiration, allergic reactions, anesthetic reactions, musculoskeletal, neurologic, morbidity/mortality, potential lack of efficacy, unilateral/bilateral ECT, relapse/maintenance issues, cognitive risks including memory, concentration, cognition, and other risks.    Side Effects:  Patient notes no cognitive/physical complaints    Exam:  Mood: depressed  Affect: Congruent and Restricted  Memory:  intact immediate, recent, remote and as evidenced by ability to present consistent history  Concentration:   focused and attentive and as assessed by  ability to concentrate on our conversation  Suicidal ideation: occassional suicidal ideation / no plan / positive safety plan    Patient Monitored:   B/P, EKG, EEG, Pulse Ox, Left Ankle Cuff    Pre-ECT Medications:Robinul 0.2 mg IV, Toradol 30 mg IV, Zofran 4 mg IV, and Caffeine 250 mg IV     ECT Medications:  Anesthetic  Etomidate 14 mg IV followed by ketamine 50 mg IV and Succinylcholine 70 mg IV and use of supplemental    Seizure Duration:  Motor: 37 seconds       EE seconds    Post-ECT Condition:  Treatment unremarkable    ECT Medications: Propofol 30 mg IV    Kath Tineo    2025

## 2025-05-19 NOTE — ANESTHESIA PREPROCEDURE EVALUATION
PRE-OP EVALUATION    Patient Name: Zeus Polk    Admit Diagnosis: Depression, unspecified depression type [F32.A]    Pre-op Diagnosis: * No pre-op diagnosis entered *        Anesthesia Procedure: ECT(BEDSIDE)    * No surgeons found in log *    Pre-op vitals reviewed.        Body mass index is 40.63 kg/m².    Current medications reviewed.  Hospital Medications:  Current Medications[1]    Outpatient Medications:   Prescriptions Prior to Admission[2]    Allergies: Seasonal      Anesthesia Evaluation    Patient summary reviewed.    Anesthetic Complications           GI/Hepatic/Renal    Negative GI/hepatic/renal ROS.                             Cardiovascular                  (+) hypertension                                     Endo/Other    Negative endo/other ROS.                              Pulmonary    Negative pulmonary ROS.                       Neuro/Psych      (+) depression                        Patient Active Problem List:     Asymmetric tonsils     Unspecified mood (affective) disorder     OCD (obsessive compulsive disorder)     a     Attention deficit hyperactivity disorder (ADHD), combined type     Learning disorder involving mathematics     Binge eating disorder           Past Surgical History[3]  Social Hx on file[4]  History   Drug Use No     Available pre-op labs reviewed.  Lab Results   Component Value Date    WBC 7.3 05/06/2025    RBC 4.54 05/06/2025    HGB 13.5 05/06/2025    HCT 38.5 05/06/2025    MCV 84.8 05/06/2025    MCH 29.7 05/06/2025    MCHC 35.1 05/06/2025    RDW 13.0 05/06/2025    .0 05/06/2025     Lab Results   Component Value Date     05/08/2025    K 4.1 05/08/2025     05/08/2025    CO2 27.0 05/08/2025    BUN 13 05/08/2025    CREATSERUM 1.22 (H) 05/08/2025    GLU 91 05/08/2025    CA 9.1 05/08/2025            Airway      Mallampati: II  Mouth opening: >3 FB  TM distance: > 6 cm  Neck ROM: full Cardiovascular    Cardiovascular exam normal.         Dental    Dentition  appears grossly intact         Pulmonary    Pulmonary exam normal.                 Other findings              ASA: 2   Plan: general  NPO status verified and patient meets guidelines.        Comment:  I explained intrinsic risks of general anesthesia, including nausea, dental damage, sore throat, mouth injury,and hoarseness from airway management.  All questions were answered and understanding was demonstrated of risks.  Informed permission was obtained to proceed as documented in the signed consent form.         Plan/risks discussed with: patient                Present on Admission:  **None**             [1]    lactated ringers infusion   Intravenous Continuous    [COMPLETED] glycopyrrolate (Robinul) 0.2 MG/ML injection 0.2 mg  0.2 mg Intravenous Once    [COMPLETED] ketorolac (Toradol) 30 MG/ML injection 30 mg  30 mg Intravenous Once    [COMPLETED] ondansetron (Zofran) 4 MG/2ML injection 4 mg  4 mg Intravenous Once    [COMPLETED] caffeine-sodium benzoate 125-125 mg/mL injection  250 mg Intravenous Once    propranolol (Inderal) tab 20 mg  20 mg Oral TID PRN    haloperidol (Haldol) tab 5 mg  5 mg Oral Q4H PRN    Or    haloperidol lactate (Haldol) 5 MG/ML injection 5 mg  5 mg Intramuscular Q4H PRN    ibuprofen (Motrin) tab 400 mg  400 mg Oral Q6H PRN    acetaminophen (Tylenol Extra Strength) tab 1,000 mg  1,000 mg Oral Daily PRN    magnesium hydroxide (Milk of Magnesia) 400 MG/5ML oral suspension 30 mL  30 mL Oral Daily PRN    guaiFENesin (Robitussin) 100 MG/5 ML oral liquid 200 mg  200 mg Oral Q4H PRN    QUEtiapine (SEROquel) tab 300 mg  300 mg Oral Nightly    QUEtiapine (SEROquel) tab 25 mg  25 mg Oral Daily PRN    alum-mag hydroxide-simethicone (Maalox) 200-200-20 MG/5ML oral suspension 30 mL  30 mL Oral Q4H PRN    acetaminophen (Tylenol) tab 325 mg  325 mg Oral Q4H PRN    Or    acetaminophen (Tylenol) tab 650 mg  650 mg Oral Q4H PRN    benztropine (Cogentin) tab 2 mg  2 mg Oral Q4H PRN    Or    benztropine  mesylate (Cogentin) 1 mg/mL injection 2 mg  2 mg Intramuscular Q4H PRN    [Held by provider] LORazepam (Ativan) tab 2 mg  2 mg Oral Q4H PRN    Or    [Held by provider] LORazepam (Ativan) 2 mg/mL injection 2 mg  2 mg Intramuscular Q4H PRN    albuterol (Ventolin HFA) 108 (90 Base) MCG/ACT inhaler 2 puff  2 puff Inhalation Q6H PRN    amLODIPine (Norvasc) tab 5 mg  5 mg Oral Daily    cetirizine (ZyrTEC) tab 10 mg  10 mg Oral Nightly   [2] (Not in a hospital admission)   [3]   Past Surgical History:  Procedure Laterality Date    Oral surgery     [4]   Social History  Socioeconomic History    Marital status: Single   Tobacco Use    Smoking status: Never    Smokeless tobacco: Never   Vaping Use    Vaping status: Never Used   Substance and Sexual Activity    Alcohol use: Yes     Comment: social drinking    Drug use: No

## 2025-05-21 ENCOUNTER — ANESTHESIA EVENT (OUTPATIENT)
Dept: POSTOP/PACU | Facility: HOSPITAL | Age: 30
End: 2025-05-21
Payer: COMMERCIAL

## 2025-05-21 ENCOUNTER — ANESTHESIA (OUTPATIENT)
Dept: POSTOP/PACU | Facility: HOSPITAL | Age: 30
End: 2025-05-21
Payer: COMMERCIAL

## 2025-05-21 RX ORDER — KETAMINE HYDROCHLORIDE 50 MG/ML
INJECTION, SOLUTION INTRAMUSCULAR; INTRAVENOUS AS NEEDED
Status: DISCONTINUED | OUTPATIENT
Start: 2025-05-21 | End: 2025-05-21 | Stop reason: SURG

## 2025-05-21 RX ORDER — ETOMIDATE 2 MG/ML
INJECTION INTRAVENOUS AS NEEDED
Status: DISCONTINUED | OUTPATIENT
Start: 2025-05-21 | End: 2025-05-21 | Stop reason: SURG

## 2025-05-21 RX ADMIN — KETAMINE HYDROCHLORIDE 50 MG: 50 INJECTION, SOLUTION INTRAMUSCULAR; INTRAVENOUS at 07:24:00

## 2025-05-21 RX ADMIN — ETOMIDATE 14 MG: 2 INJECTION INTRAVENOUS at 07:24:00

## 2025-05-21 NOTE — ANESTHESIA PREPROCEDURE EVALUATION
PRE-OP EVALUATION    Patient Name: Zeus Polk    Admit Diagnosis: Depression, unspecified depression type [F32.A]    Pre-op Diagnosis: * No pre-op diagnosis entered *        Anesthesia Procedure: ECT(BEDSIDE)    * No surgeons found in log *    Pre-op vitals reviewed.        There is no height or weight on file to calculate BMI.    Current medications reviewed.  Hospital Medications:  Current Medications[1]    Outpatient Medications:   Prescriptions Prior to Admission[2]    Allergies: Seasonal      Anesthesia Evaluation    Patient summary reviewed.    Anesthetic Complications           GI/Hepatic/Renal    Negative GI/hepatic/renal ROS.                             Cardiovascular                  (+) hypertension                                     Endo/Other    Negative endo/other ROS.                              Pulmonary    Negative pulmonary ROS.                       Neuro/Psych      (+) depression                        Patient Active Problem List:     Asymmetric tonsils     Unspecified mood (affective) disorder     OCD (obsessive compulsive disorder)     a     Attention deficit hyperactivity disorder (ADHD), combined type     Learning disorder involving mathematics     Binge eating disorder           Past Surgical History[3]  Social Hx on file[4]  History   Drug Use No     Available pre-op labs reviewed.  Lab Results   Component Value Date    WBC 7.3 05/06/2025    RBC 4.54 05/06/2025    HGB 13.5 05/06/2025    HCT 38.5 05/06/2025    MCV 84.8 05/06/2025    MCH 29.7 05/06/2025    MCHC 35.1 05/06/2025    RDW 13.0 05/06/2025    .0 05/06/2025     Lab Results   Component Value Date     05/08/2025    K 4.1 05/08/2025     05/08/2025    CO2 27.0 05/08/2025    BUN 13 05/08/2025    CREATSERUM 1.22 (H) 05/08/2025    GLU 91 05/08/2025    CA 9.1 05/08/2025            Airway      Mallampati: II  Mouth opening: >3 FB  TM distance: > 6 cm  Neck ROM: full Cardiovascular    Cardiovascular exam normal.          Dental    Dentition appears grossly intact         Pulmonary    Pulmonary exam normal.                 Other findings              ASA: 2   Plan: general  NPO status verified and patient meets guidelines.        Comment: I explained the intrinsic risks of general anesthesia for ECT procedure, including PONV, muscle soreness, dental /lip injury, recall, and other serious but rare complications (life-threatening cardiopulmonary events). All questions were answered and patient demonstrated understanding of realistic expectations and risks of undergoing anesthesia. Informed consent was signed by patient.       Plan/risks discussed with: patient                Present on Admission:  **None**             [1]    lactated ringers infusion   Intravenous Continuous    [COMPLETED] glycopyrrolate (Robinul) 0.2 MG/ML injection 0.2 mg  0.2 mg Intravenous Once    [COMPLETED] ketorolac (Toradol) 30 MG/ML injection 30 mg  30 mg Intravenous Once    [COMPLETED] ondansetron (Zofran) 4 MG/2ML injection 4 mg  4 mg Intravenous Once    [COMPLETED] caffeine-sodium benzoate 125-125 mg/mL injection  250 mg Intravenous Once    LORazepam (Ativan) tab 2 mg  2 mg Oral Q4H PRN    Or    LORazepam (Ativan) 2 mg/mL injection 2 mg  2 mg Intramuscular Q4H PRN    propranolol (Inderal) tab 20 mg  20 mg Oral TID PRN    haloperidol (Haldol) tab 5 mg  5 mg Oral Q4H PRN    Or    haloperidol lactate (Haldol) 5 MG/ML injection 5 mg  5 mg Intramuscular Q4H PRN    ibuprofen (Motrin) tab 400 mg  400 mg Oral Q6H PRN    acetaminophen (Tylenol Extra Strength) tab 1,000 mg  1,000 mg Oral Daily PRN    magnesium hydroxide (Milk of Magnesia) 400 MG/5ML oral suspension 30 mL  30 mL Oral Daily PRN    guaiFENesin (Robitussin) 100 MG/5 ML oral liquid 200 mg  200 mg Oral Q4H PRN    QUEtiapine (SEROquel) tab 300 mg  300 mg Oral Nightly    QUEtiapine (SEROquel) tab 25 mg  25 mg Oral Daily PRN    alum-mag hydroxide-simethicone (Maalox) 200-200-20 MG/5ML oral suspension 30 mL   30 mL Oral Q4H PRN    acetaminophen (Tylenol) tab 325 mg  325 mg Oral Q4H PRN    Or    acetaminophen (Tylenol) tab 650 mg  650 mg Oral Q4H PRN    benztropine (Cogentin) tab 2 mg  2 mg Oral Q4H PRN    Or    benztropine mesylate (Cogentin) 1 mg/mL injection 2 mg  2 mg Intramuscular Q4H PRN    albuterol (Ventolin HFA) 108 (90 Base) MCG/ACT inhaler 2 puff  2 puff Inhalation Q6H PRN    amLODIPine (Norvasc) tab 5 mg  5 mg Oral Daily    cetirizine (ZyrTEC) tab 10 mg  10 mg Oral Nightly   [2] (Not in a hospital admission)   [3]   Past Surgical History:  Procedure Laterality Date    Oral surgery     [4]   Social History  Socioeconomic History    Marital status: Single   Tobacco Use    Smoking status: Never    Smokeless tobacco: Never   Vaping Use    Vaping status: Never Used   Substance and Sexual Activity    Alcohol use: Yes     Comment: social drinking    Drug use: No

## 2025-05-21 NOTE — ANESTHESIA POSTPROCEDURE EVALUATION
[facility]    Zeus Polk Patient Status:  Outpatient   Age/Gender 29 year old female MRN [id number]   Location [facility] POST ANESTHESIA CARE UNIT Attending Estuardo Correa MD   Hosp Day # 0 PCP Lucrecia Crain       Anesthesia Post-op Note        Procedure Summary       Date: 05/21/25 Room / Location: [facility] Post Anesthesia Care Unit    Anesthesia Start: 0718 Anesthesia Stop: 0735    Procedure: ECT(BEDSIDE) Diagnosis: Unspecified mood (affective) disorder    Scheduled Providers:  Anesthesiologist: Lui Paige MD    Anesthesia Type: general ASA Status: 2            Anesthesia Type: general    Vitals Value Taken Time   /101 05/21/25 07:35   Temp   05/21/25 07:35   Pulse 83 05/21/25 07:35   Resp 19 05/21/25 07:35   SpO2 99 05/21/25 07:35           Patient Location: PACU    Anesthesia Type: general    Airway Patency: patent    Postop Pain Control: adequate    Mental Status: mildly sedated but able to meaningfully participate in the post-anesthesia evaluation    Nausea/Vomiting: none    Cardiopulmonary/Hydration status: stable euvolemic    Complications: no apparent anesthesia related complications    Postop vital signs: stable    Dental Exam: Unchanged from Preop

## 2025-05-23 ENCOUNTER — ANESTHESIA (OUTPATIENT)
Dept: POSTOP/PACU | Facility: HOSPITAL | Age: 30
End: 2025-05-23
Payer: COMMERCIAL

## 2025-05-23 ENCOUNTER — ANESTHESIA EVENT (OUTPATIENT)
Dept: POSTOP/PACU | Facility: HOSPITAL | Age: 30
End: 2025-05-23
Payer: COMMERCIAL

## 2025-05-23 VITALS — BODY MASS INDEX: 41 KG/M2 | HEIGHT: 67 IN

## 2025-05-23 RX ORDER — KETAMINE HYDROCHLORIDE 50 MG/ML
INJECTION, SOLUTION INTRAMUSCULAR; INTRAVENOUS AS NEEDED
Status: DISCONTINUED | OUTPATIENT
Start: 2025-05-23 | End: 2025-05-23 | Stop reason: SURG

## 2025-05-23 RX ORDER — ETOMIDATE 2 MG/ML
INJECTION INTRAVENOUS AS NEEDED
Status: DISCONTINUED | OUTPATIENT
Start: 2025-05-23 | End: 2025-05-23 | Stop reason: SURG

## 2025-05-23 RX ADMIN — KETAMINE HYDROCHLORIDE 25 MG: 50 INJECTION, SOLUTION INTRAMUSCULAR; INTRAVENOUS at 06:52:00

## 2025-05-23 RX ADMIN — SODIUM CHLORIDE, SODIUM LACTATE, POTASSIUM CHLORIDE, CALCIUM CHLORIDE: 600; 310; 30; 20 INJECTION, SOLUTION INTRAVENOUS at 06:51:00

## 2025-05-23 RX ADMIN — ETOMIDATE 16 MG: 2 INJECTION INTRAVENOUS at 06:52:00

## 2025-05-23 RX ADMIN — SODIUM CHLORIDE, SODIUM LACTATE, POTASSIUM CHLORIDE, CALCIUM CHLORIDE: 600; 310; 30; 20 INJECTION, SOLUTION INTRAVENOUS at 06:58:00

## 2025-05-23 NOTE — ANESTHESIA PREPROCEDURE EVALUATION
PRE-OP EVALUATION    Patient Name: Zeus Polk    Admit Diagnosis: Depression, unspecified depression type [F32.A]    Pre-op Diagnosis: * No pre-op diagnosis entered *        Anesthesia Procedure: ECT(BEDSIDE)    * No surgeons found in log *    Pre-op vitals reviewed.        Body mass index is 40.03 kg/m².    Current medications reviewed.  Hospital Medications:  Current Medications[1]    Outpatient Medications:   Prescriptions Prior to Admission[2]    Allergies: Seasonal      Anesthesia Evaluation    Patient summary reviewed.    Anesthetic Complications           GI/Hepatic/Renal    Negative GI/hepatic/renal ROS.                             Cardiovascular                  (+) hypertension                                     Endo/Other    Negative endo/other ROS.                              Pulmonary    Negative pulmonary ROS.                       Neuro/Psych      (+) depression                        Patient Active Problem List:     Asymmetric tonsils     Unspecified mood (affective) disorder     OCD (obsessive compulsive disorder)     a     Attention deficit hyperactivity disorder (ADHD), combined type     Learning disorder involving mathematics     Binge eating disorder           Past Surgical History[3]  Social Hx on file[4]  History   Drug Use No     Available pre-op labs reviewed.  Lab Results   Component Value Date    WBC 7.3 05/06/2025    RBC 4.54 05/06/2025    HGB 13.5 05/06/2025    HCT 38.5 05/06/2025    MCV 84.8 05/06/2025    MCH 29.7 05/06/2025    MCHC 35.1 05/06/2025    RDW 13.0 05/06/2025    .0 05/06/2025     Lab Results   Component Value Date     05/08/2025    K 4.1 05/08/2025     05/08/2025    CO2 27.0 05/08/2025    BUN 13 05/08/2025    CREATSERUM 1.22 (H) 05/08/2025    GLU 91 05/08/2025    CA 9.1 05/08/2025            Airway      Mallampati: II  Mouth opening: >3 FB  TM distance: > 6 cm  Neck ROM: full Cardiovascular    Cardiovascular exam normal.         Dental    Dentition  appears grossly intact         Pulmonary    Pulmonary exam normal.                 Other findings              ASA: 2   Plan: general  NPO status verified and patient meets guidelines.        Comment: I explained the intrinsic risks of general anesthesia for ECT procedure, including PONV, muscle soreness, dental /lip injury, recall, and other serious but rare complications (life-threatening cardiopulmonary events). All questions were answered and patient demonstrated understanding of realistic expectations and risks of undergoing anesthesia. Informed consent was signed by patient.       Plan/risks discussed with: patient                Present on Admission:  **None**             [1]    lactated ringers infusion   Intravenous Continuous    [COMPLETED] glycopyrrolate (Robinul) 0.2 MG/ML injection 0.2 mg  0.2 mg Intravenous Once    [COMPLETED] ketorolac (Toradol) 30 MG/ML injection 30 mg  30 mg Intravenous Once    [COMPLETED] ondansetron (Zofran) 4 MG/2ML injection 4 mg  4 mg Intravenous Once    caffeine-sodium benzoate 125-125 mg/mL injection  250 mg Intravenous Once    [COMPLETED] ondansetron (Zofran) 4 MG/2ML injection        [COMPLETED] ketorolac (Toradol) 30 MG/ML injection        [COMPLETED] glycopyrrolate (Robinul) 0.2 MG/ML injection        [COMPLETED] caffeine-sodium benzoate 125-125 MG/ML injection        propranolol (Inderal) tab 20 mg  20 mg Oral TID    LORazepam (Ativan) tab 2 mg  2 mg Oral Q4H PRN    Or    LORazepam (Ativan) 2 mg/mL injection 2 mg  2 mg Intramuscular Q4H PRN    haloperidol (Haldol) tab 5 mg  5 mg Oral Q4H PRN    Or    haloperidol lactate (Haldol) 5 MG/ML injection 5 mg  5 mg Intramuscular Q4H PRN    ibuprofen (Motrin) tab 400 mg  400 mg Oral Q6H PRN    acetaminophen (Tylenol Extra Strength) tab 1,000 mg  1,000 mg Oral Daily PRN    magnesium hydroxide (Milk of Magnesia) 400 MG/5ML oral suspension 30 mL  30 mL Oral Daily PRN    guaiFENesin (Robitussin) 100 MG/5 ML oral liquid 200 mg  200 mg  Oral Q4H PRN    QUEtiapine (SEROquel) tab 300 mg  300 mg Oral Nightly    QUEtiapine (SEROquel) tab 25 mg  25 mg Oral Daily PRN    alum-mag hydroxide-simethicone (Maalox) 200-200-20 MG/5ML oral suspension 30 mL  30 mL Oral Q4H PRN    acetaminophen (Tylenol) tab 325 mg  325 mg Oral Q4H PRN    Or    acetaminophen (Tylenol) tab 650 mg  650 mg Oral Q4H PRN    benztropine (Cogentin) tab 2 mg  2 mg Oral Q4H PRN    Or    benztropine mesylate (Cogentin) 1 mg/mL injection 2 mg  2 mg Intramuscular Q4H PRN    albuterol (Ventolin HFA) 108 (90 Base) MCG/ACT inhaler 2 puff  2 puff Inhalation Q6H PRN    amLODIPine (Norvasc) tab 5 mg  5 mg Oral Daily    cetirizine (ZyrTEC) tab 10 mg  10 mg Oral Nightly   [2] (Not in a hospital admission)   [3]   Past Surgical History:  Procedure Laterality Date    Oral surgery     [4]   Social History  Socioeconomic History    Marital status: Single   Tobacco Use    Smoking status: Never    Smokeless tobacco: Never   Vaping Use    Vaping status: Never Used   Substance and Sexual Activity    Alcohol use: Yes     Comment: social drinking    Drug use: No

## 2025-05-23 NOTE — ANESTHESIA POSTPROCEDURE EVALUATION
[facility]    Zeus Polk Patient Status:  Outpatient   Age/Gender 29 year old female MRN [id number]   Location [facility] POST ANESTHESIA CARE UNIT Attending Estuardo Correa MD   Hosp Day # 0 PCP Lucrecia Crain       Anesthesia Post-op Note        Procedure Summary       Date: 05/23/25 Room / Location: [facility] Post Anesthesia Care Unit    Anesthesia Start: 0651 Anesthesia Stop: 0702    Procedure: ECT(BEDSIDE) Diagnosis: Unspecified mood (affective) disorder    Scheduled Providers:  Anesthesiologist: Erika Miranda MD    Anesthesia Type: general ASA Status: 2            Anesthesia Type: general    Vitals Value Taken Time   /92 05/23/25 07:02   Temp  05/23/25 07:02   Pulse 73 05/23/25 07:02   Resp 20 05/23/25 07:02   SpO2 99 05/23/25 07:02           Patient Location: PACU    Anesthesia Type: general    Airway Patency: patent    Postop Pain Control: adequate    Mental Status: mildly sedated but able to meaningfully participate in the post-anesthesia evaluation    Nausea/Vomiting: none    Cardiopulmonary/Hydration status: stable euvolemic    Complications: no apparent anesthesia related complications    Postop vital signs: stable    Patient to be discharged from PACU when criteria met.

## 2025-05-27 ENCOUNTER — HOSPITAL ENCOUNTER (OUTPATIENT)
Dept: POSTOP/PACU | Facility: HOSPITAL | Age: 30
Setting detail: HOSPITAL OUTPATIENT SURGERY
Discharge: HOME | End: 2025-05-27
Attending: Other
Payer: COMMERCIAL

## 2025-05-27 ENCOUNTER — ANESTHESIA EVENT (OUTPATIENT)
Dept: POSTOP/PACU | Facility: HOSPITAL | Age: 30
End: 2025-05-27
Payer: COMMERCIAL

## 2025-05-27 ENCOUNTER — ANESTHESIA (OUTPATIENT)
Dept: POSTOP/PACU | Facility: HOSPITAL | Age: 30
End: 2025-05-27
Payer: COMMERCIAL

## 2025-05-27 VITALS
BODY MASS INDEX: 41 KG/M2 | SYSTOLIC BLOOD PRESSURE: 128 MMHG | TEMPERATURE: 98 F | HEART RATE: 95 BPM | HEIGHT: 67 IN | OXYGEN SATURATION: 99 % | RESPIRATION RATE: 20 BRPM | DIASTOLIC BLOOD PRESSURE: 77 MMHG

## 2025-05-27 DIAGNOSIS — F39 UNSPECIFIED MOOD (AFFECTIVE) DISORDER: ICD-10-CM

## 2025-05-27 PROBLEM — F41.1 GAD (GENERALIZED ANXIETY DISORDER): Status: ACTIVE | Noted: 2018-07-13

## 2025-05-27 PROBLEM — F43.10 PTSD (POST-TRAUMATIC STRESS DISORDER): Chronic | Status: ACTIVE | Noted: 2025-05-07

## 2025-05-27 RX ORDER — GLYCOPYRROLATE 0.2 MG/ML
INJECTION, SOLUTION INTRAMUSCULAR; INTRAVENOUS
Status: COMPLETED
Start: 2025-05-27 | End: 2025-05-27

## 2025-05-27 RX ORDER — ONDANSETRON 2 MG/ML
4 INJECTION INTRAMUSCULAR; INTRAVENOUS ONCE
Status: COMPLETED | OUTPATIENT
Start: 2025-05-27 | End: 2025-05-27

## 2025-05-27 RX ORDER — SODIUM CHLORIDE, SODIUM LACTATE, POTASSIUM CHLORIDE, CALCIUM CHLORIDE 600; 310; 30; 20 MG/100ML; MG/100ML; MG/100ML; MG/100ML
INJECTION, SOLUTION INTRAVENOUS CONTINUOUS
Status: DISCONTINUED | OUTPATIENT
Start: 2025-05-27 | End: 2025-05-27 | Stop reason: HOSPADM

## 2025-05-27 RX ORDER — GLYCOPYRROLATE 0.2 MG/ML
0.2 INJECTION, SOLUTION INTRAMUSCULAR; INTRAVENOUS ONCE
Status: COMPLETED | OUTPATIENT
Start: 2025-05-27 | End: 2025-05-27

## 2025-05-27 RX ORDER — KETOROLAC TROMETHAMINE 30 MG/ML
INJECTION, SOLUTION INTRAMUSCULAR; INTRAVENOUS
Status: COMPLETED
Start: 2025-05-27 | End: 2025-05-27

## 2025-05-27 RX ORDER — ONDANSETRON 2 MG/ML
INJECTION INTRAMUSCULAR; INTRAVENOUS
Status: COMPLETED
Start: 2025-05-27 | End: 2025-05-27

## 2025-05-27 RX ORDER — ONDANSETRON 2 MG/ML
4 INJECTION INTRAMUSCULAR; INTRAVENOUS EVERY 6 HOURS PRN
Status: DISCONTINUED | OUTPATIENT
Start: 2025-05-27 | End: 2025-05-27 | Stop reason: HOSPADM

## 2025-05-27 RX ORDER — ETOMIDATE 2 MG/ML
INJECTION INTRAVENOUS AS NEEDED
Status: DISCONTINUED | OUTPATIENT
Start: 2025-05-27 | End: 2025-05-27 | Stop reason: SURG

## 2025-05-27 RX ORDER — KETOROLAC TROMETHAMINE 30 MG/ML
30 INJECTION, SOLUTION INTRAMUSCULAR; INTRAVENOUS ONCE
Status: COMPLETED | OUTPATIENT
Start: 2025-05-27 | End: 2025-05-27

## 2025-05-27 RX ORDER — PROCHLORPERAZINE EDISYLATE 5 MG/ML
5 INJECTION INTRAMUSCULAR; INTRAVENOUS EVERY 8 HOURS PRN
Status: DISCONTINUED | OUTPATIENT
Start: 2025-05-27 | End: 2025-05-27 | Stop reason: HOSPADM

## 2025-05-27 RX ORDER — SODIUM CHLORIDE, SODIUM LACTATE, POTASSIUM CHLORIDE, CALCIUM CHLORIDE 600; 310; 30; 20 MG/100ML; MG/100ML; MG/100ML; MG/100ML
INJECTION, SOLUTION INTRAVENOUS CONTINUOUS PRN
Status: DISCONTINUED | OUTPATIENT
Start: 2025-05-27 | End: 2025-05-27 | Stop reason: SURG

## 2025-05-27 RX ORDER — CAFFEINE AND SODIUM BENZOATE 125 MG/ML
INJECTION, SOLUTION INTRAMUSCULAR; INTRAVENOUS
Status: DISCONTINUED
Start: 2025-05-27 | End: 2025-05-27 | Stop reason: WASHOUT

## 2025-05-27 RX ORDER — ACETAMINOPHEN 500 MG
1000 TABLET ORAL ONCE AS NEEDED
Status: DISCONTINUED | OUTPATIENT
Start: 2025-05-27 | End: 2025-05-27 | Stop reason: HOSPADM

## 2025-05-27 RX ADMIN — ETOMIDATE 16 MG: 2 INJECTION INTRAVENOUS at 08:01:00

## 2025-05-27 RX ADMIN — ETOMIDATE 4 MG: 2 INJECTION INTRAVENOUS at 08:05:00

## 2025-05-27 RX ADMIN — GLYCOPYRROLATE 0.2 MG: 0.2 INJECTION, SOLUTION INTRAMUSCULAR; INTRAVENOUS at 06:55:00

## 2025-05-27 RX ADMIN — ETOMIDATE 8 MG: 2 INJECTION INTRAVENOUS at 08:02:00

## 2025-05-27 RX ADMIN — SODIUM CHLORIDE, SODIUM LACTATE, POTASSIUM CHLORIDE, CALCIUM CHLORIDE: 600; 310; 30; 20 INJECTION, SOLUTION INTRAVENOUS at 07:57:00

## 2025-05-27 RX ADMIN — KETOROLAC TROMETHAMINE 30 MG: 30 INJECTION, SOLUTION INTRAMUSCULAR; INTRAVENOUS at 06:55:00

## 2025-05-27 RX ADMIN — ETOMIDATE 8 MG: 2 INJECTION INTRAVENOUS at 08:04:00

## 2025-05-27 RX ADMIN — ONDANSETRON 4 MG: 2 INJECTION INTRAMUSCULAR; INTRAVENOUS at 06:55:00

## 2025-05-27 NOTE — ANESTHESIA PREPROCEDURE EVALUATION
PRE-OP EVALUATION    Patient Name: Zeus Polk    Admit Diagnosis: Depression, unspecified depression type [F32.A]    Pre-op Diagnosis: * No pre-op diagnosis entered *        Anesthesia Procedure: ECT(BEDSIDE)    * No surgeons found in log *    Pre-op vitals reviewed.  Temp: 98 °F (36.7 °C)  Pulse: 88  Resp: 18  BP: 134/95  SpO2: 99 %  Body mass index is 40.58 kg/m².    Current medications reviewed.  Hospital Medications:  Current Medications[1]    Outpatient Medications:   Prescriptions Prior to Admission[2]    Allergies: Seasonal      Anesthesia Evaluation    Patient summary reviewed.    Anesthetic Complications           GI/Hepatic/Renal    Negative GI/hepatic/renal ROS.                             Cardiovascular                  (+) hypertension                                     Endo/Other    Negative endo/other ROS.                              Pulmonary    Negative pulmonary ROS.                       Neuro/Psych      (+) depression                        Patient Active Problem List:     Asymmetric tonsils     Unspecified mood (affective) disorder     OCD (obsessive compulsive disorder)     a     Attention deficit hyperactivity disorder (ADHD), combined type     Learning disorder involving mathematics     Binge eating disorder           Past Surgical History[3]  Social Hx on file[4]  History   Drug Use No     Available pre-op labs reviewed.  Lab Results   Component Value Date    WBC 7.3 05/06/2025    RBC 4.54 05/06/2025    HGB 13.5 05/06/2025    HCT 38.5 05/06/2025    MCV 84.8 05/06/2025    MCH 29.7 05/06/2025    MCHC 35.1 05/06/2025    RDW 13.0 05/06/2025    .0 05/06/2025     Lab Results   Component Value Date     05/08/2025    K 4.1 05/08/2025     05/08/2025    CO2 27.0 05/08/2025    BUN 13 05/08/2025    CREATSERUM 1.22 (H) 05/08/2025    GLU 91 05/08/2025    CA 9.1 05/08/2025            Airway      Mallampati: II  Mouth opening: >3 FB  TM distance: > 6 cm  Neck ROM: full  Cardiovascular    Cardiovascular exam normal.         Dental    Dentition appears grossly intact         Pulmonary    Pulmonary exam normal.                 Other findings              ASA: 2   Plan: general  NPO status verified and patient meets guidelines.        Comment: I explained the intrinsic risks of general anesthesia for ECT procedure, including PONV, muscle soreness, dental /lip injury, recall, and other serious but rare complications (life-threatening cardiopulmonary events). All questions were answered and patient demonstrated understanding of realistic expectations and risks of undergoing anesthesia. Informed consent was signed by patient.       Plan/risks discussed with: patient                Present on Admission:  **None**             [1]    [COMPLETED] glycopyrrolate (Robinul) 0.2 MG/ML injection 0.2 mg  0.2 mg Intravenous Once    [COMPLETED] ketorolac (Toradol) 30 MG/ML injection 30 mg  30 mg Intravenous Once    [COMPLETED] ondansetron (Zofran) 4 MG/2ML injection 4 mg  4 mg Intravenous Once    [COMPLETED] ondansetron (Zofran) 4 MG/2ML injection        [COMPLETED] ketorolac (Toradol) 30 MG/ML injection        [COMPLETED] glycopyrrolate (Robinul) 0.2 MG/ML injection        LORazepam (Ativan) tab 1 mg  1 mg Oral TID    OLANZapine (ZyPREXA) tab 2.5 mg  2.5 mg Oral Q4H PRN    Or    OLANZapine (Zyprexa) IM injection 2.5 mg  2.5 mg Intramuscular Q4H PRN    LORazepam (Ativan) tab 1 mg  1 mg Oral Q4H PRN    Or    LORazepam (Ativan) 2 mg/mL injection 1 mg  1 mg Intramuscular Q4H PRN    propranolol (Inderal) tab 20 mg  20 mg Oral TID PRN    ibuprofen (Motrin) tab 400 mg  400 mg Oral Q6H PRN    acetaminophen (Tylenol Extra Strength) tab 1,000 mg  1,000 mg Oral Daily PRN    magnesium hydroxide (Milk of Magnesia) 400 MG/5ML oral suspension 30 mL  30 mL Oral Daily PRN    guaiFENesin (Robitussin) 100 MG/5 ML oral liquid 200 mg  200 mg Oral Q4H PRN    QUEtiapine (SEROquel) tab 300 mg  300 mg Oral Nightly     QUEtiapine (SEROquel) tab 25 mg  25 mg Oral Daily PRN    alum-mag hydroxide-simethicone (Maalox) 200-200-20 MG/5ML oral suspension 30 mL  30 mL Oral Q4H PRN    acetaminophen (Tylenol) tab 325 mg  325 mg Oral Q4H PRN    Or    acetaminophen (Tylenol) tab 650 mg  650 mg Oral Q4H PRN    benztropine (Cogentin) tab 2 mg  2 mg Oral Q4H PRN    Or    benztropine mesylate (Cogentin) 1 mg/mL injection 2 mg  2 mg Intramuscular Q4H PRN    albuterol (Ventolin HFA) 108 (90 Base) MCG/ACT inhaler 2 puff  2 puff Inhalation Q6H PRN    amLODIPine (Norvasc) tab 5 mg  5 mg Oral Daily    cetirizine (ZyrTEC) tab 10 mg  10 mg Oral Nightly   [2] (Not in a hospital admission)   [3]   Past Surgical History:  Procedure Laterality Date    Oral surgery     [4]   Social History  Socioeconomic History    Marital status: Single   Tobacco Use    Smoking status: Never    Smokeless tobacco: Never   Vaping Use    Vaping status: Never Used   Substance and Sexual Activity    Alcohol use: Yes     Comment: social drinking    Drug use: No

## 2025-05-27 NOTE — PROGRESS NOTES
[facility]  ECT History & Physical    Zeus Polk Patient Status:  Inpatient   Age/Gender 29 year old female MRN [id number]   Location [facility] POST ANESTHESIA CARE UNIT Attending Estuardo Correa MD   Hosp Day # 0 PCP Lucrecia Crain     Date of Service: 5/27/2025    Diagnosis:  Major Depression Recurrent Severe Without Psychotic Features. F33.2    Procedure:  Bitemporal    HPI: Patient remains depressed, obsessive, and hopeless.  No physical complaints      Medical History:  Past Medical History[1]    Surgical History:  Past Surgical History[2]    Family History:  Family History[3]    Social History:  Social History     Socioeconomic History    Marital status: Single     Spouse name: Not on file    Number of children: Not on file    Years of education: Not on file    Highest education level: Not on file   Occupational History    Not on file   Tobacco Use    Smoking status: Never    Smokeless tobacco: Never   Vaping Use    Vaping status: Never Used   Substance and Sexual Activity    Alcohol use: Yes     Comment: social drinking    Drug use: No    Sexual activity: Not on file   Other Topics Concern    Caffeine Concern Not Asked    Exercise Not Asked    Seat Belt Not Asked    Special Diet Not Asked    Stress Concern Not Asked    Weight Concern Not Asked   Social History Narrative    Not on file     Social Drivers of Health     Food Insecurity: No Food Insecurity (5/8/2025)    NCSS - Food Insecurity     Worried About Running Out of Food in the Last Year: No     Ran Out of Food in the Last Year: No   Transportation Needs: No Transportation Needs (5/8/2025)    NCSS - Transportation     Lack of Transportation: No   Housing Stability: Unknown (5/8/2025)    NCSS - Housing/Utilities     Has Housing: Yes     Worried About Losing Housing: No     Unable to Get Utilities: Not on file       ROS:  unremarkable    Physical Exam:   Ht 67\"   LMP 05/15/2025 (Exact Date)   BMI 40.58 kg/m²     General  Appearance:    Alert, cooperative, no distress, appears stated age   Head:    Normocephalic, without obvious abnormality, atraumatic   Eyes:    PERRL, conjunctiva/corneas clear, EOM's intact   Nose:   Nares normal, septum midline, mucosa normal, no drainage    or sinus tenderness   Throat:   Lips, mucosa, and tongue normal; teeth and gums normal   Neck:   Supple, symmetrical, trachea midline   Lungs:     Clear to auscultation bilaterally, respirations unlabored    Heart:    Regular rate and rhythm, S1 and S2 normal, no murmur, rub or gallop   Abdomen:     Soft, non-tender, bowel sounds active all four quadrants,     no masses, no organomegaly   Extremities:   Extremities normal, atraumatic, no cyanosis or edema   Pulses:   2+ and symmetric all extremities   Skin:   Skin color, texture, turgor normal, no rashes or lesions   Neurologic:   CNII-XII intact, normal strength, sensation and reflexes     throughout     Impressions & Plans:    Diagnosis:  Major Depression Recurrent Severe Without Psychotic Features. F33.2    Procedure:  Bitemporal    I have discussed the risks and benefits and alternatives with the patient/family.  They understand and agree to proceed with plan of care.    Ktah Tineo MD  5/27/2025         [1]   Past Medical History:   ADHD    Anxiety    Bipolar disorder (HCC)    Depression    Essential hypertension    High blood pressure    OCD (obsessive compulsive disorder)    Schizophrenia (HCC)   [2]   Past Surgical History:  Procedure Laterality Date    Oral surgery     [3]   Family History  Family history unknown: Yes

## 2025-05-27 NOTE — ANESTHESIA POSTPROCEDURE EVALUATION
[facility]    Zeus Polk Patient Status:  Inpatient   Age/Gender 29 year old female MRN [id number]   Location [facility] POST ANESTHESIA CARE UNIT Attending Estuardo Correa MD   Hosp Day # 0 PCP Lucrecia Crain       Anesthesia Post-op Note        Procedure Summary       Date: 05/27/25 Room / Location: [facility] Post Anesthesia Care Unit    Anesthesia Start: 0757 Anesthesia Stop: 0819    Procedure: ECT(BEDSIDE) Diagnosis: Unspecified mood (affective) disorder    Scheduled Providers:  Anesthesiologist: Anna Ospina DO    Anesthesia Type: general ASA Status: 2            Anesthesia Type: general    Vitals Value Taken Time   /67 05/27/25 08:21   Temp  05/27/25 08:21   Pulse 80 05/27/25 08:21   Resp 18 05/27/25 08:21   SpO2 98 05/27/25 08:21           Patient Location: PACU    Anesthesia Type: general    Airway Patency: patent    Postop Pain Control: adequate    Mental Status: mildly sedated but able to meaningfully participate in the post-anesthesia evaluation    Nausea/Vomiting: none    Cardiopulmonary/Hydration status: stable euvolemic    Complications: no apparent anesthesia related complications    Postop vital signs: stable    Dental Exam: Unchanged from Preop    Patient to be discharged from PACU when criteria met.

## 2025-05-27 NOTE — PROGRESS NOTES
[facility]  ECT Procedure Note    Zeus Polk Patient Status:  Outpatient   Age/Gender 29 year old female   MRN [id number]    Location [facility] POST ANESTHESIA CARE UNIT Attending No att. providers found   Hosp Day # 0 PCP Lucrecia Crain     ECT Number: #6 total-#3 bitemporal    Diagnosis: Major Depression Recurrent Severe Without Psychotic Features. F33.2    Type of ECT:  Bitemporal    Place of Service:  Inpatient    Settings:   1.  Energy Percentage: 100 %    2.  Program: Pulse Width 1.5    Pre-ECT Evaluation    Symptoms:      Prior to procedure, reviewed with treatment team correct patient, time of procedure and type of ECT.  Also reviewed with anesthesia pre-ECT medications    Patient extremely depressed and dysphoric.  Patient with continued intrusive obsessional thoughts, including thoughts about suicide.  Patient notes relief of restlessness with scheduled Ativan but unwilling to continue it  scheduled.  Patient is noted to be more irritable this morning.  Patient still with intrusive thoughts about caffeine continuing to cause restlessness despite the fact that she has not had it in days.  Patient appears to be very impressionable and then will fixate on comments made by peers or staff, such as comments made by staff on the unit that ECT could be worsening her restlessness (though this writer confirmed with staff what was said and patient misinterpreted it ).  Patient often appears to misinterpret/or not comprehend what others have said.    The patient continues to retain capacity to consent for ECT.    Risk/Benefits:  Discussed with patient side effects of ECT including headache, teeth, jaw, cardiac, pulmonary, NPO, aspiration, allergic reactions, anesthetic reactions, musculoskeletal, neurologic, morbidity/mortality, potential lack of efficacy, unilateral/bilateral ECT, relapse/maintenance issues, cognitive risks including memory, concentration, cognition, and  other risks.    Side Effects:  Mild memory complaint    Exam:  Mood: depressed, anxious, and irritable  Affect: Congruent and Blunted  Memory:  intact immediate, recent, remote and as evidenced by ability to present consistent history  Concentration:   focused and attentive and as assessed by  ability to concentrate on our conversation  Suicidal ideation: occassional suicidal ideation / no plan / positive safety plan    Patient Monitored:  B/P, EKG, EEG, Pulse Ox, Left Ankle Cuff    Pre-ECT Medications:  Robinul 0.2 mg IV, Toradol 30 mg IV, Zofran 4 mg IV     ECT Medications:  Anesthetic  Etomidate 16 mg IV  plus 8 plus plua 8 plua 4, prop 30 plus 8E followed by succinylcholine 70 mg IV and soft bite block.  Skip prop. Nex time consider breivtial     Seizure Duration:  Motor: 46 seconds       EE seconds    Post-ECT Condition:  Treatment unremarkable    ECT Medications: Propofol 50 mg IV    Kath Tineo    2025

## 2025-05-29 ENCOUNTER — ANESTHESIA EVENT (OUTPATIENT)
Dept: POSTOP/PACU | Facility: HOSPITAL | Age: 30
End: 2025-05-29
Payer: COMMERCIAL

## 2025-05-29 ENCOUNTER — ANESTHESIA (OUTPATIENT)
Dept: POSTOP/PACU | Facility: HOSPITAL | Age: 30
End: 2025-05-29
Payer: COMMERCIAL

## 2025-05-29 RX ORDER — METHOHEXITAL IN WATER/PF 100MG/10ML
SYRINGE (ML) INTRAVENOUS AS NEEDED
Status: DISCONTINUED | OUTPATIENT
Start: 2025-05-29 | End: 2025-05-29 | Stop reason: SURG

## 2025-05-29 RX ADMIN — METHOHEXITAL IN WATER/PF 100 MG: 100MG/10ML SYRINGE (ML) INTRAVENOUS at 07:43:00

## 2025-05-29 NOTE — ANESTHESIA PREPROCEDURE EVALUATION
PRE-OP EVALUATION    Patient Name: Zeus Polk    Admit Diagnosis: Depression, unspecified depression type [F32.A]    Pre-op Diagnosis: * No pre-op diagnosis entered *        Anesthesia Procedure: ECT(BEDSIDE)    * No surgeons found in log *    Pre-op vitals reviewed.  Temp: 97.4 °F (36.3 °C)  Pulse: 90  Resp: 12  BP: 139/104  SpO2: 100 %  Body mass index is 40.58 kg/m².    Current medications reviewed.  Hospital Medications:  Current Medications[1]    Outpatient Medications:   Prescriptions Prior to Admission[2]    Allergies: Seasonal      Anesthesia Evaluation    Patient summary reviewed.    Anesthetic Complications           GI/Hepatic/Renal    Negative GI/hepatic/renal ROS.                             Cardiovascular                  (+) hypertension                                     Endo/Other    Negative endo/other ROS.                              Pulmonary    Negative pulmonary ROS.                       Neuro/Psych      (+) depression                        Patient Active Problem List:     Asymmetric tonsils     Unspecified mood (affective) disorder     OCD (obsessive compulsive disorder)     a     Attention deficit hyperactivity disorder (ADHD), combined type     Learning disorder involving mathematics     Binge eating disorder           Past Surgical History[3]  Social Hx on file[4]  History   Drug Use No     Available pre-op labs reviewed.  Lab Results   Component Value Date    WBC 7.3 05/06/2025    RBC 4.54 05/06/2025    HGB 13.5 05/06/2025    HCT 38.5 05/06/2025    MCV 84.8 05/06/2025    MCH 29.7 05/06/2025    MCHC 35.1 05/06/2025    RDW 13.0 05/06/2025    .0 05/06/2025     Lab Results   Component Value Date     05/08/2025    K 4.1 05/08/2025     05/08/2025    CO2 27.0 05/08/2025    BUN 13 05/08/2025    CREATSERUM 1.22 (H) 05/08/2025    GLU 91 05/08/2025    CA 9.1 05/08/2025            Airway      Mallampati: II  Mouth opening: >3 FB  TM distance: > 6 cm  Neck ROM: full  Cardiovascular    Cardiovascular exam normal.         Dental    Dentition appears grossly intact         Pulmonary    Pulmonary exam normal.                 Other findings              ASA: 2   Plan: general  NPO status verified and patient meets guidelines.        Comment: I explained the intrinsic risks of general anesthesia for ECT procedure, including PONV, muscle soreness, dental /lip injury, recall, and other serious but rare complications (life-threatening cardiopulmonary events). All questions were answered and patient demonstrated understanding of realistic expectations and risks of undergoing anesthesia. Informed consent was signed by patient.       Plan/risks discussed with: patient                Present on Admission:  **None**             [1]    benztropine (Cogentin) tab 2 mg  2 mg Oral Q4H PRN    Or    benztropine mesylate (Cogentin) 1 mg/mL injection 2 mg  2 mg Intramuscular Q4H PRN    [COMPLETED] acetaminophen (Tylenol Extra Strength) tab 1,000 mg  1,000 mg Oral Once    lactated ringers infusion   Intravenous Continuous    [COMPLETED] glycopyrrolate (Robinul) 0.2 MG/ML injection 0.2 mg  0.2 mg Intravenous Once    [COMPLETED] ketorolac (Toradol) 30 MG/ML injection 30 mg  30 mg Intravenous Once    [COMPLETED] ondansetron (Zofran) 4 MG/2ML injection 4 mg  4 mg Intravenous Once    [COMPLETED] methohexital (BrevITAL) 100 mg/10mL IV syringe        LORazepam (Ativan) tab 1 mg  1 mg Oral TID    OLANZapine (ZyPREXA) tab 2.5 mg  2.5 mg Oral Q4H PRN    Or    OLANZapine (Zyprexa) IM injection 2.5 mg  2.5 mg Intramuscular Q4H PRN    LORazepam (Ativan) tab 1 mg  1 mg Oral Q4H PRN    Or    LORazepam (Ativan) 2 mg/mL injection 1 mg  1 mg Intramuscular Q4H PRN    propranolol (Inderal) tab 20 mg  20 mg Oral TID PRN    ibuprofen (Motrin) tab 400 mg  400 mg Oral Q6H PRN    acetaminophen (Tylenol Extra Strength) tab 1,000 mg  1,000 mg Oral Daily PRN    magnesium hydroxide (Milk of Magnesia) 400 MG/5ML oral suspension 30  mL  30 mL Oral Daily PRN    guaiFENesin (Robitussin) 100 MG/5 ML oral liquid 200 mg  200 mg Oral Q4H PRN    QUEtiapine (SEROquel) tab 300 mg  300 mg Oral Nightly    QUEtiapine (SEROquel) tab 25 mg  25 mg Oral Daily PRN    alum-mag hydroxide-simethicone (Maalox) 200-200-20 MG/5ML oral suspension 30 mL  30 mL Oral Q4H PRN    acetaminophen (Tylenol) tab 325 mg  325 mg Oral Q4H PRN    Or    acetaminophen (Tylenol) tab 650 mg  650 mg Oral Q4H PRN    albuterol (Ventolin HFA) 108 (90 Base) MCG/ACT inhaler 2 puff  2 puff Inhalation Q6H PRN    amLODIPine (Norvasc) tab 5 mg  5 mg Oral Daily    cetirizine (ZyrTEC) tab 10 mg  10 mg Oral Nightly   [2] (Not in a hospital admission)   [3]   Past Surgical History:  Procedure Laterality Date    Oral surgery     [4]   Social History  Socioeconomic History    Marital status: Single   Tobacco Use    Smoking status: Never    Smokeless tobacco: Never   Vaping Use    Vaping status: Never Used   Substance and Sexual Activity    Alcohol use: Yes     Comment: social drinking    Drug use: No

## 2025-05-29 NOTE — ANESTHESIA POSTPROCEDURE EVALUATION
[facility]    Zeus Polk Patient Status:  Inpatient   Age/Gender 29 year old female MRN [id number]   Location [facility] POST ANESTHESIA CARE UNIT Attending Estuardo Correa MD   Hosp Day # 0 PCP Lucrecia Crain       Anesthesia Post-op Note        Procedure Summary       Date: 05/29/25 Room / Location: [facility] Post Anesthesia Care Unit    Anesthesia Start: 0739 Anesthesia Stop: 0752    Procedure: ECT(BEDSIDE) Diagnosis: Unspecified mood (affective) disorder    Scheduled Providers:  Anesthesiologist:     Anesthesia Type: general ASA Status: 2            Anesthesia Type: general    Vitals Value Taken Time   /65 05/29/25 07:55   Temp 98 05/29/25 07:55   Pulse 77 05/29/25 07:55   Resp 18 05/29/25 07:55   SpO2 99 05/29/25 07:55           Patient Location: PACU    Anesthesia Type: general    Airway Patency: patent    Postop Pain Control: adequate    Mental Status: mildly sedated but able to meaningfully participate in the post-anesthesia evaluation    Nausea/Vomiting: none    Cardiopulmonary/Hydration status: stable euvolemic    Complications: no apparent anesthesia related complications    Postop vital signs: stable    Dental Exam: Unchanged from Preop

## 2025-05-30 ENCOUNTER — HOSPITAL ENCOUNTER (OUTPATIENT)
Dept: POSTOP/PACU | Facility: HOSPITAL | Age: 30
Setting detail: HOSPITAL OUTPATIENT SURGERY
Discharge: HOME | End: 2025-05-30
Attending: Other
Payer: COMMERCIAL

## 2025-06-02 ENCOUNTER — HOSPITAL ENCOUNTER (OUTPATIENT)
Dept: POSTOP/PACU | Facility: HOSPITAL | Age: 30
Discharge: HOME | End: 2025-06-02
Attending: Other
Payer: COMMERCIAL

## 2025-06-02 ENCOUNTER — ANESTHESIA EVENT (OUTPATIENT)
Dept: POSTOP/PACU | Facility: HOSPITAL | Age: 30
End: 2025-06-02
Payer: COMMERCIAL

## 2025-06-02 ENCOUNTER — ANESTHESIA (OUTPATIENT)
Dept: POSTOP/PACU | Facility: HOSPITAL | Age: 30
End: 2025-06-02
Payer: COMMERCIAL

## 2025-06-02 VITALS
RESPIRATION RATE: 13 BRPM | BODY MASS INDEX: 41 KG/M2 | HEART RATE: 80 BPM | HEIGHT: 67 IN | DIASTOLIC BLOOD PRESSURE: 93 MMHG | SYSTOLIC BLOOD PRESSURE: 130 MMHG | OXYGEN SATURATION: 100 % | TEMPERATURE: 98 F

## 2025-06-02 DIAGNOSIS — F39 UNSPECIFIED MOOD (AFFECTIVE) DISORDER: ICD-10-CM

## 2025-06-02 RX ORDER — METHOHEXITAL IN WATER/PF 100MG/10ML
SYRINGE (ML) INTRAVENOUS AS NEEDED
Status: DISCONTINUED | OUTPATIENT
Start: 2025-06-02 | End: 2025-06-02 | Stop reason: SURG

## 2025-06-02 RX ORDER — LIDOCAINE HYDROCHLORIDE 10 MG/ML
INJECTION, SOLUTION EPIDURAL; INFILTRATION; INTRACAUDAL; PERINEURAL AS NEEDED
Status: DISCONTINUED | OUTPATIENT
Start: 2025-06-02 | End: 2025-06-02 | Stop reason: SURG

## 2025-06-02 RX ORDER — ONDANSETRON 2 MG/ML
4 INJECTION INTRAMUSCULAR; INTRAVENOUS ONCE
Status: COMPLETED | OUTPATIENT
Start: 2025-06-02 | End: 2025-06-02

## 2025-06-02 RX ORDER — NALOXONE HYDROCHLORIDE 0.4 MG/ML
0.08 INJECTION, SOLUTION INTRAMUSCULAR; INTRAVENOUS; SUBCUTANEOUS AS NEEDED
Status: ACTIVE | OUTPATIENT
Start: 2025-06-02 | End: 2025-06-02

## 2025-06-02 RX ORDER — HYDROMORPHONE HYDROCHLORIDE 1 MG/ML
0.6 INJECTION, SOLUTION INTRAMUSCULAR; INTRAVENOUS; SUBCUTANEOUS EVERY 5 MIN PRN
Status: ACTIVE | OUTPATIENT
Start: 2025-06-02 | End: 2025-06-02

## 2025-06-02 RX ORDER — KETOROLAC TROMETHAMINE 30 MG/ML
30 INJECTION, SOLUTION INTRAMUSCULAR; INTRAVENOUS ONCE
Status: COMPLETED | OUTPATIENT
Start: 2025-06-02 | End: 2025-06-02

## 2025-06-02 RX ORDER — METHOHEXITAL IN WATER/PF 100MG/10ML
SYRINGE (ML) INTRAVENOUS
Status: COMPLETED
Start: 2025-06-02 | End: 2025-06-02

## 2025-06-02 RX ORDER — HYDROMORPHONE HYDROCHLORIDE 1 MG/ML
0.2 INJECTION, SOLUTION INTRAMUSCULAR; INTRAVENOUS; SUBCUTANEOUS EVERY 5 MIN PRN
Status: ACTIVE | OUTPATIENT
Start: 2025-06-02 | End: 2025-06-02

## 2025-06-02 RX ORDER — GLYCOPYRROLATE 0.2 MG/ML
INJECTION, SOLUTION INTRAMUSCULAR; INTRAVENOUS
Status: COMPLETED
Start: 2025-06-02 | End: 2025-06-02

## 2025-06-02 RX ORDER — SODIUM CHLORIDE, SODIUM LACTATE, POTASSIUM CHLORIDE, CALCIUM CHLORIDE 600; 310; 30; 20 MG/100ML; MG/100ML; MG/100ML; MG/100ML
INJECTION, SOLUTION INTRAVENOUS CONTINUOUS
Status: DISCONTINUED | OUTPATIENT
Start: 2025-06-02 | End: 2025-06-04

## 2025-06-02 RX ORDER — GLYCOPYRROLATE 0.2 MG/ML
0.2 INJECTION, SOLUTION INTRAMUSCULAR; INTRAVENOUS ONCE
Status: COMPLETED | OUTPATIENT
Start: 2025-06-02 | End: 2025-06-02

## 2025-06-02 RX ORDER — ONDANSETRON 2 MG/ML
INJECTION INTRAMUSCULAR; INTRAVENOUS
Status: COMPLETED
Start: 2025-06-02 | End: 2025-06-02

## 2025-06-02 RX ORDER — HYDROMORPHONE HYDROCHLORIDE 1 MG/ML
0.4 INJECTION, SOLUTION INTRAMUSCULAR; INTRAVENOUS; SUBCUTANEOUS EVERY 5 MIN PRN
Status: ACTIVE | OUTPATIENT
Start: 2025-06-02 | End: 2025-06-02

## 2025-06-02 RX ORDER — KETOROLAC TROMETHAMINE 30 MG/ML
INJECTION, SOLUTION INTRAMUSCULAR; INTRAVENOUS
Status: COMPLETED
Start: 2025-06-02 | End: 2025-06-02

## 2025-06-02 RX ADMIN — KETOROLAC TROMETHAMINE 30 MG: 30 INJECTION, SOLUTION INTRAMUSCULAR; INTRAVENOUS at 06:50:00

## 2025-06-02 RX ADMIN — ONDANSETRON 4 MG: 2 INJECTION INTRAMUSCULAR; INTRAVENOUS at 06:48:00

## 2025-06-02 RX ADMIN — SODIUM CHLORIDE, SODIUM LACTATE, POTASSIUM CHLORIDE, CALCIUM CHLORIDE: 600; 310; 30; 20 INJECTION, SOLUTION INTRAVENOUS at 06:48:00

## 2025-06-02 RX ADMIN — METHOHEXITAL IN WATER/PF 100 MG: 100MG/10ML SYRINGE (ML) INTRAVENOUS at 06:58:00

## 2025-06-02 RX ADMIN — GLYCOPYRROLATE 0.2 MG: 0.2 INJECTION, SOLUTION INTRAMUSCULAR; INTRAVENOUS at 06:48:00

## 2025-06-02 RX ADMIN — LIDOCAINE HYDROCHLORIDE 20 MG: 10 INJECTION, SOLUTION EPIDURAL; INFILTRATION; INTRACAUDAL; PERINEURAL at 06:58:00

## 2025-06-02 NOTE — PROGRESS NOTES
[facility]  ECT History & Physical    Zeus Polk Patient Status:  Outpatient   Age/Gender 29 year old female MRN [id number]   Location [facility] POST ANESTHESIA CARE UNIT Attending Estuardo Correa MD   Hosp Day # 0 PCP Lucrecia Crain     Date of Service: 6/2/2025    Diagnosis:  Major Depression Recurrent Severe Without Psychotic Features. F33.2    Procedure:  Bifrontal    HPI: Mood depressed.  Noting continued cognitive side effects.      Medical History:  Past Medical History[1]    Surgical History:  Past Surgical History[2]    Family History:  Family History[3]    Social History:  Social History     Socioeconomic History    Marital status: Single     Spouse name: Not on file    Number of children: Not on file    Years of education: Not on file    Highest education level: Not on file   Occupational History    Not on file   Tobacco Use    Smoking status: Never    Smokeless tobacco: Never   Vaping Use    Vaping status: Never Used   Substance and Sexual Activity    Alcohol use: Yes     Comment: social drinking    Drug use: No    Sexual activity: Not on file   Other Topics Concern    Caffeine Concern Not Asked    Exercise Not Asked    Seat Belt Not Asked    Special Diet Not Asked    Stress Concern Not Asked    Weight Concern Not Asked   Social History Narrative    Not on file     Social Drivers of Health     Food Insecurity: No Food Insecurity (5/8/2025)    NCSS - Food Insecurity     Worried About Running Out of Food in the Last Year: No     Ran Out of Food in the Last Year: No   Transportation Needs: No Transportation Needs (5/8/2025)    NCSS - Transportation     Lack of Transportation: No   Housing Stability: Unknown (5/8/2025)    NCSS - Housing/Utilities     Has Housing: Yes     Worried About Losing Housing: No     Unable to Get Utilities: Not on file       ROS:  unremarkable    Physical Exam:   Ht 67\"   LMP 05/15/2025 (Exact Date)   BMI 41.25 kg/m²     General Appearance:    Alert,  cooperative, no distress, appears stated age   Head:    Normocephalic, without obvious abnormality, atraumatic   Eyes:    PERRL, conjunctiva/corneas clear, EOM's intact   Nose:   Nares normal, septum midline, mucosa normal, no drainage    or sinus tenderness   Throat:   Lips, mucosa, and tongue normal; teeth and gums normal   Neck:   Supple, symmetrical, trachea midline   Lungs:     Clear to auscultation bilaterally, respirations unlabored    Heart:    Regular rate and rhythm, S1 and S2 normal, no murmur, rub or gallop   Abdomen:     Soft, non-tender, bowel sounds active all four quadrants,     no masses, no organomegaly   Extremities:   Extremities normal, atraumatic, no cyanosis or edema   Pulses:   2+ and symmetric all extremities   Skin:   Skin color, texture, turgor normal, no rashes or lesions   Neurologic:   CNII-XII intact, normal strength, sensation and reflexes     throughout     Impressions & Plans:    Diagnosis:  Major Depression Recurrent Severe Without Psychotic Features. F33.2    Procedure:  Bifrontal    I have discussed the risks and benefits and alternatives with the patient/family.  They understand and agree to proceed with plan of care.    Ktah Tineo MD  6/2/2025           [1]   Past Medical History:   ADHD    Anxiety    Bipolar disorder (HCC)    Depression    Essential hypertension    High blood pressure    OCD (obsessive compulsive disorder)    Schizophrenia (HCC)   [2]   Past Surgical History:  Procedure Laterality Date    Oral surgery     [3]   Family History  Family history unknown: Yes

## 2025-06-02 NOTE — ANESTHESIA PREPROCEDURE EVALUATION
PRE-OP EVALUATION    Patient Name: Zeus Polk    Admit Diagnosis: Depression, unspecified depression type [F32.A]    Pre-op Diagnosis: * No pre-op diagnosis entered *        Anesthesia Procedure: ECT(BEDSIDE)    * No surgeons found in log *    Pre-op vitals reviewed.        Body mass index is 41.25 kg/m².    Current medications reviewed.  Hospital Medications:  Current Medications[1]    Outpatient Medications:   Prescriptions Prior to Admission[2]    Allergies: Seasonal      Anesthesia Evaluation    Patient summary reviewed.    Anesthetic Complications           GI/Hepatic/Renal    Negative GI/hepatic/renal ROS.                             Cardiovascular                  (+) hypertension                                     Endo/Other    Negative endo/other ROS.                              Pulmonary    Negative pulmonary ROS.                       Neuro/Psych      (+) depression                        Patient Active Problem List:     Asymmetric tonsils     Unspecified mood (affective) disorder     OCD (obsessive compulsive disorder)     a     Attention deficit hyperactivity disorder (ADHD), combined type     Learning disorder involving mathematics     Binge eating disorder           Past Surgical History[3]  Social Hx on file[4]  History   Drug Use No     Available pre-op labs reviewed.  Lab Results   Component Value Date    WBC 7.3 05/06/2025    RBC 4.54 05/06/2025    HGB 13.5 05/06/2025    HCT 38.5 05/06/2025    MCV 84.8 05/06/2025    MCH 29.7 05/06/2025    MCHC 35.1 05/06/2025    RDW 13.0 05/06/2025    .0 05/06/2025     Lab Results   Component Value Date     05/08/2025    K 4.1 05/08/2025     05/08/2025    CO2 27.0 05/08/2025    BUN 13 05/08/2025    CREATSERUM 1.22 (H) 05/08/2025    GLU 91 05/08/2025    CA 9.1 05/08/2025            Airway      Mallampati: II  Mouth opening: >3 FB  TM distance: > 6 cm  Neck ROM: full Cardiovascular    Cardiovascular exam normal.         Dental    Dentition  appears grossly intact         Pulmonary    Pulmonary exam normal.                 Other findings              ASA: 2   Plan: general  NPO status verified and patient meets guidelines.        Comment: I explained the intrinsic risks of general anesthesia for ECT procedure, including PONV, muscle soreness, dental /lip injury, recall, and other serious but rare complications (life-threatening cardiopulmonary events). All questions were answered and patient demonstrated understanding of realistic expectations and risks of undergoing anesthesia. Informed consent was signed by patient.       Plan/risks discussed with: patient                Present on Admission:  **None**             [1]    lactated ringers infusion   Intravenous Continuous    glycopyrrolate (Robinul) 0.2 MG/ML injection 0.2 mg  0.2 mg Intravenous Once    ketorolac (Toradol) 30 MG/ML injection 30 mg  30 mg Intravenous Once    ondansetron (Zofran) 4 MG/2ML injection 4 mg  4 mg Intravenous Once    [COMPLETED] ondansetron (Zofran) 4 MG/2ML injection        [COMPLETED] ketorolac (Toradol) 30 MG/ML injection        [COMPLETED] glycopyrrolate (Robinul) 0.2 MG/ML injection        benztropine (Cogentin) tab 2 mg  2 mg Oral Q4H PRN    Or    benztropine mesylate (Cogentin) 1 mg/mL injection 2 mg  2 mg Intramuscular Q4H PRN    OLANZapine (ZyPREXA) tab 2.5 mg  2.5 mg Oral Q4H PRN    Or    OLANZapine (Zyprexa) IM injection 2.5 mg  2.5 mg Intramuscular Q4H PRN    [Held by provider] LORazepam (Ativan) tab 1 mg  1 mg Oral Q4H PRN    Or    [Held by provider] LORazepam (Ativan) 2 mg/mL injection 1 mg  1 mg Intramuscular Q4H PRN    propranolol (Inderal) tab 20 mg  20 mg Oral TID PRN    ibuprofen (Motrin) tab 400 mg  400 mg Oral Q6H PRN    acetaminophen (Tylenol Extra Strength) tab 1,000 mg  1,000 mg Oral Daily PRN    magnesium hydroxide (Milk of Magnesia) 400 MG/5ML oral suspension 30 mL  30 mL Oral Daily PRN    guaiFENesin (Robitussin) 100 MG/5 ML oral liquid 200 mg  200  mg Oral Q4H PRN    QUEtiapine (SEROquel) tab 300 mg  300 mg Oral Nightly    QUEtiapine (SEROquel) tab 25 mg  25 mg Oral Daily PRN    alum-mag hydroxide-simethicone (Maalox) 200-200-20 MG/5ML oral suspension 30 mL  30 mL Oral Q4H PRN    acetaminophen (Tylenol) tab 325 mg  325 mg Oral Q4H PRN    Or    acetaminophen (Tylenol) tab 650 mg  650 mg Oral Q4H PRN    albuterol (Ventolin HFA) 108 (90 Base) MCG/ACT inhaler 2 puff  2 puff Inhalation Q6H PRN    amLODIPine (Norvasc) tab 5 mg  5 mg Oral Daily    cetirizine (ZyrTEC) tab 10 mg  10 mg Oral Nightly   [2] (Not in a hospital admission)   [3]   Past Surgical History:  Procedure Laterality Date    Oral surgery     [4]   Social History  Socioeconomic History    Marital status: Single   Tobacco Use    Smoking status: Never    Smokeless tobacco: Never   Vaping Use    Vaping status: Never Used   Substance and Sexual Activity    Alcohol use: Yes     Comment: social drinking    Drug use: No

## 2025-06-02 NOTE — ANESTHESIA POSTPROCEDURE EVALUATION
[facility]    Zeus Polk Patient Status:  Outpatient   Age/Gender 29 year old female MRN [id number]   Location [facility] POST ANESTHESIA CARE UNIT Attending Estuardo Correa MD   Hosp Day # 0 PCP Lucrecia Crain       Anesthesia Post-op Note        Procedure Summary       Date: 06/02/25 Room / Location: [facility] Post Anesthesia Care Unit    Anesthesia Start: 0658 Anesthesia Stop: 0713    Procedure: ECT(BEDSIDE) Diagnosis: Unspecified mood (affective) disorder    Scheduled Providers:  Anesthesiologist: Suleiman Davalos MD    Anesthesia Type: general ASA Status: 2            Anesthesia Type: general    Vitals Value Taken Time   /103 06/02/25 07:13   Temp  06/02/25 07:14   Pulse 112 06/02/25 07:13   Resp 14 06/02/25 07:13   SpO2 99 % 06/02/25 07:13           Patient Location: PACU    Anesthesia Type: general    Airway Patency: patent    Postop Pain Control: adequate    Mental Status: mildly sedated but able to meaningfully participate in the post-anesthesia evaluation    Nausea/Vomiting: none    Cardiopulmonary/Hydration status: stable euvolemic    Complications: no apparent anesthesia related complications    Postop vital signs: stable    Dental Exam: Unchanged from Preop    Patient to be discharged from PACU when criteria met.

## 2025-06-02 NOTE — PROGRESS NOTES
[facility]  ECT Procedure Note    Zeus Polk Patient Status:  Outpatient   Age/Gender 29 year old female   MRN [id number]    Location [facility] POST ANESTHESIA CARE UNIT Attending No att. providers found   Hosp Day # 0 PCP Lucrecia Crain     ECT Number: #8 total-#2 bifrontal    Diagnosis: Major Depression Recurrent Severe Without Psychotic Features. F33.2    Type of ECT:  Bifrontal    Place of Service:  Inpatient    Settings:   1.  Energy Percentage: 100%    2.  Program:  Pulse Width  1.5    Pre-ECT Evaluation    Symptoms:      Prior to procedure, reviewed with treatment team correct patient, time of procedure and type of ECT.  Also reviewed with anesthesia pre-ECT medications    Patient is calm and somewhat bizarre, making concrete and provocative statements.  Patient says she is grateful for ECT because she percieves it has made her forget trauma in childhood.  Patient has limited understanding into her belief that intentionally increasing  cognitive side effects of ECT is not appropriate treatment for her depression or her PTSD.  Patient states emphatically, \"you are only going to help me if you help me forget.  Patient had to switch from bitemporal treatments to bifrontal treatments because she was having such significant side effects from them previously and this was very disturbing to her.    The patient continues to retain capacity to consent for ECT.    Risk/Benefits:  Discussed with patient side effects of ECT including headache, teeth, jaw, cardiac, pulmonary, NPO, aspiration, allergic reactions, anesthetic reactions, musculoskeletal, neurologic, morbidity/mortality, potential lack of efficacy, unilateral/bilateral ECT, relapse/maintenance issues, cognitive risks including memory, concentration, cognition, and other risks.    Side Effects:  Moderate memory complaint    Exam:  Mood: Dysphoric and irritable  Affect: Congruent and Blunted  Memory:  intact immediate,  recent, remote and as evidenced by ability to present consistent history  Concentration:   focused and attentive and as assessed by  ability to concentrate on our conversation  Suicidal ideation: occassional suicidal ideation / no plan / positive safety plan    Patient Monitored:  B/P, EKG, EEG, Pulse Ox, Left Ankle Cuff    Pre-ECT Medications:   Robinul 0.2 mg IV, Toradol 30 mg IV, and Zofran 4 mg IV     ECT Medications:  Lidocaine 20 mg IV Anesthetic  Brevital 100 mg IV and Succinylcholine 70 mg IV and use of soft bite block.     Seizure Duration:  Motor: 44 seconds       EE seconds    Post-ECT Condition:  Treatment unremarkable    ECT Medications: Propofol 50 mg IV    Kath Tineo    2025

## 2025-06-04 ENCOUNTER — ANESTHESIA (OUTPATIENT)
Dept: POSTOP/PACU | Facility: HOSPITAL | Age: 30
End: 2025-06-04
Payer: COMMERCIAL

## 2025-06-04 ENCOUNTER — ANESTHESIA EVENT (OUTPATIENT)
Dept: POSTOP/PACU | Facility: HOSPITAL | Age: 30
End: 2025-06-04
Payer: COMMERCIAL

## 2025-06-04 RX ORDER — LIDOCAINE HYDROCHLORIDE 10 MG/ML
INJECTION, SOLUTION EPIDURAL; INFILTRATION; INTRACAUDAL; PERINEURAL AS NEEDED
Status: DISCONTINUED | OUTPATIENT
Start: 2025-06-04 | End: 2025-06-04 | Stop reason: SURG

## 2025-06-04 RX ORDER — METHOHEXITAL IN WATER/PF 100MG/10ML
SYRINGE (ML) INTRAVENOUS AS NEEDED
Status: DISCONTINUED | OUTPATIENT
Start: 2025-06-04 | End: 2025-06-04 | Stop reason: SURG

## 2025-06-04 RX ADMIN — METHOHEXITAL IN WATER/PF 100 MG: 100MG/10ML SYRINGE (ML) INTRAVENOUS at 07:40:00

## 2025-06-04 RX ADMIN — LIDOCAINE HYDROCHLORIDE 20 MG: 10 INJECTION, SOLUTION EPIDURAL; INFILTRATION; INTRACAUDAL; PERINEURAL at 07:40:00

## 2025-06-04 NOTE — ANESTHESIA PREPROCEDURE EVALUATION
PRE-OP EVALUATION    Patient Name: Zeus Polk    Admit Diagnosis: Depression, unspecified depression type [F32.A]    Pre-op Diagnosis: * No pre-op diagnosis entered *        Anesthesia Procedure: ECT(BEDSIDE)    * No surgeons found in log *    Pre-op vitals reviewed.  Temp: 98.7 °F (37.1 °C)  Pulse: 91  Resp: 12  BP: 124/91  SpO2: 99 %  Body mass index is 41.76 kg/m².    Current medications reviewed.  Hospital Medications:  Current Medications[1]    Outpatient Medications:   Prescriptions Prior to Admission[2]    Allergies: Seasonal      Anesthesia Evaluation    Patient summary reviewed.    Anesthetic Complications           GI/Hepatic/Renal    Negative GI/hepatic/renal ROS.                             Cardiovascular                  (+) hypertension                                     Endo/Other    Negative endo/other ROS.                              Pulmonary    Negative pulmonary ROS.                       Neuro/Psych      (+) depression                        Patient Active Problem List:     Asymmetric tonsils     Unspecified mood (affective) disorder     OCD (obsessive compulsive disorder)     a     Attention deficit hyperactivity disorder (ADHD), combined type     Learning disorder involving mathematics     Binge eating disorder           Past Surgical History[3]  Social Hx on file[4]  History   Drug Use No     Available pre-op labs reviewed.  Lab Results   Component Value Date    WBC 7.3 05/06/2025    RBC 4.54 05/06/2025    HGB 13.5 05/06/2025    HCT 38.5 05/06/2025    MCV 84.8 05/06/2025    MCH 29.7 05/06/2025    MCHC 35.1 05/06/2025    RDW 13.0 05/06/2025    .0 05/06/2025     Lab Results   Component Value Date     05/08/2025    K 4.1 05/08/2025     05/08/2025    CO2 27.0 05/08/2025    BUN 13 05/08/2025    CREATSERUM 1.22 (H) 05/08/2025    GLU 91 05/08/2025    CA 9.1 05/08/2025            Airway      Mallampati: II  Mouth opening: >3 FB  TM distance: > 6 cm  Neck ROM: full  Cardiovascular    Cardiovascular exam normal.         Dental    Dentition appears grossly intact         Pulmonary    Pulmonary exam normal.                 Other findings              ASA: 3   Plan: general  NPO status verified and patient meets guidelines.        Comment: I explained the intrinsic risks of general anesthesia for ECT procedure, including PONV, muscle soreness, dental /lip injury, recall, and other serious but rare complications (life-threatening cardiopulmonary events). All questions were answered and patient demonstrated understanding of realistic expectations and risks of undergoing anesthesia. Informed consent was signed by patient.       Plan/risks discussed with: patient                Present on Admission:  **None**             [1]    OLANZapine (ZyPREXA) tab 2.5 mg  2.5 mg Oral Q4H PRN    Or    OLANZapine (Zyprexa) IM injection 2.5 mg  2.5 mg Intramuscular Q4H PRN    lactated ringers infusion   Intravenous Continuous    [COMPLETED] glycopyrrolate (Robinul) 0.2 MG/ML injection 0.2 mg  0.2 mg Intravenous Once    [COMPLETED] ketorolac (Toradol) 30 MG/ML injection 30 mg  30 mg Intravenous Once    [COMPLETED] ondansetron (Zofran) 4 MG/2ML injection 4 mg  4 mg Intravenous Once    [COMPLETED] methohexital (BrevITAL) 100 mg/10mL IV syringe        [COMPLETED] ondansetron (Zofran) 4 MG/2ML injection        [COMPLETED] ketorolac (Toradol) 30 MG/ML injection        [COMPLETED] glycopyrrolate (Robinul) 0.2 MG/ML injection        clomiPRAMINE (Anafranil) cap 25 mg  25 mg Oral Nightly    benztropine (Cogentin) tab 2 mg  2 mg Oral Q4H PRN    Or    benztropine mesylate (Cogentin) 1 mg/mL injection 2 mg  2 mg Intramuscular Q4H PRN    LORazepam (Ativan) tab 1 mg  1 mg Oral Q4H PRN    Or    LORazepam (Ativan) 2 mg/mL injection 1 mg  1 mg Intramuscular Q4H PRN    propranolol (Inderal) tab 20 mg  20 mg Oral TID PRN    ibuprofen (Motrin) tab 400 mg  400 mg Oral Q6H PRN    acetaminophen (Tylenol Extra Strength) tab  1,000 mg  1,000 mg Oral Daily PRN    magnesium hydroxide (Milk of Magnesia) 400 MG/5ML oral suspension 30 mL  30 mL Oral Daily PRN    guaiFENesin (Robitussin) 100 MG/5 ML oral liquid 200 mg  200 mg Oral Q4H PRN    QUEtiapine (SEROquel) tab 300 mg  300 mg Oral Nightly    QUEtiapine (SEROquel) tab 25 mg  25 mg Oral Daily PRN    alum-mag hydroxide-simethicone (Maalox) 200-200-20 MG/5ML oral suspension 30 mL  30 mL Oral Q4H PRN    acetaminophen (Tylenol) tab 325 mg  325 mg Oral Q4H PRN    Or    acetaminophen (Tylenol) tab 650 mg  650 mg Oral Q4H PRN    albuterol (Ventolin HFA) 108 (90 Base) MCG/ACT inhaler 2 puff  2 puff Inhalation Q6H PRN    amLODIPine (Norvasc) tab 5 mg  5 mg Oral Daily    cetirizine (ZyrTEC) tab 10 mg  10 mg Oral Nightly   [2] (Not in a hospital admission)   [3]   Past Surgical History:  Procedure Laterality Date    Oral surgery     [4]   Social History  Socioeconomic History    Marital status: Single   Tobacco Use    Smoking status: Never    Smokeless tobacco: Never   Vaping Use    Vaping status: Never Used   Substance and Sexual Activity    Alcohol use: Yes     Comment: social drinking    Drug use: No

## 2025-06-04 NOTE — ANESTHESIA POSTPROCEDURE EVALUATION
[facility]    Zeus Polk Patient Status:  Outpatient   Age/Gender 29 year old female MRN [id number]   Location [facility] POST ANESTHESIA CARE UNIT Attending Kath Tineo MD   Hosp Day # 0 PCP Lucrecia Crain       Anesthesia Post-op Note        Procedure Summary       Date: 06/04/25 Room / Location: [facility] Post Anesthesia Care Unit    Anesthesia Start: 0736 Anesthesia Stop: 0748    Procedure: ECT(BEDSIDE) Diagnosis: Unspecified mood (affective) disorder    Scheduled Providers:  Anesthesiologist: Catracho Boss MD    Anesthesia Type: general ASA Status: 3            Anesthesia Type: general    Vitals Value Taken Time   /96 06/04/25 07:49   Temp  06/04/25 07:49   Pulse 87 06/04/25 07:49   Resp 16 06/04/25 07:49   SpO2 98 % 06/04/25 07:49           Patient Location: PACU    Anesthesia Type: general    Airway Patency: patent    Postop Pain Control: adequate    Mental Status: mildly sedated but able to meaningfully participate in the post-anesthesia evaluation    Nausea/Vomiting: none    Cardiopulmonary/Hydration status: stable euvolemic    Complications: no apparent anesthesia related complications    Postop vital signs: stable    Dental Exam: Unchanged from Preop    Patient to be discharged from PACU when criteria met.

## 2025-06-10 ENCOUNTER — HOSPITAL ENCOUNTER (OUTPATIENT)
Dept: POSTOP/PACU | Facility: HOSPITAL | Age: 30
Discharge: HOME | End: 2025-06-10
Attending: Other
Payer: COMMERCIAL

## 2025-06-10 ENCOUNTER — ANESTHESIA EVENT (OUTPATIENT)
Dept: POSTOP/PACU | Facility: HOSPITAL | Age: 30
End: 2025-06-10
Payer: COMMERCIAL

## 2025-06-10 ENCOUNTER — ANESTHESIA (OUTPATIENT)
Dept: POSTOP/PACU | Facility: HOSPITAL | Age: 30
End: 2025-06-10
Payer: COMMERCIAL

## 2025-06-10 VITALS
DIASTOLIC BLOOD PRESSURE: 89 MMHG | RESPIRATION RATE: 18 BRPM | HEART RATE: 86 BPM | TEMPERATURE: 99 F | OXYGEN SATURATION: 97 % | HEIGHT: 67 IN | SYSTOLIC BLOOD PRESSURE: 142 MMHG | BODY MASS INDEX: 41 KG/M2

## 2025-06-10 DIAGNOSIS — F39 UNSPECIFIED MOOD (AFFECTIVE) DISORDER: ICD-10-CM

## 2025-06-10 RX ORDER — ONDANSETRON 2 MG/ML
INJECTION INTRAMUSCULAR; INTRAVENOUS
Status: COMPLETED
Start: 2025-06-10 | End: 2025-06-10

## 2025-06-10 RX ORDER — SODIUM CHLORIDE, SODIUM LACTATE, POTASSIUM CHLORIDE, CALCIUM CHLORIDE 600; 310; 30; 20 MG/100ML; MG/100ML; MG/100ML; MG/100ML
INJECTION, SOLUTION INTRAVENOUS CONTINUOUS
Status: DISCONTINUED | OUTPATIENT
Start: 2025-06-10 | End: 2025-06-12

## 2025-06-10 RX ORDER — HYDROMORPHONE HYDROCHLORIDE 1 MG/ML
0.2 INJECTION, SOLUTION INTRAMUSCULAR; INTRAVENOUS; SUBCUTANEOUS EVERY 5 MIN PRN
Status: DISCONTINUED | OUTPATIENT
Start: 2025-06-10 | End: 2025-06-10 | Stop reason: HOSPADM

## 2025-06-10 RX ORDER — LIDOCAINE HYDROCHLORIDE 10 MG/ML
INJECTION, SOLUTION EPIDURAL; INFILTRATION; INTRACAUDAL; PERINEURAL AS NEEDED
Status: DISCONTINUED | OUTPATIENT
Start: 2025-06-10 | End: 2025-06-10 | Stop reason: SURG

## 2025-06-10 RX ORDER — SODIUM CHLORIDE, SODIUM LACTATE, POTASSIUM CHLORIDE, CALCIUM CHLORIDE 600; 310; 30; 20 MG/100ML; MG/100ML; MG/100ML; MG/100ML
INJECTION, SOLUTION INTRAVENOUS CONTINUOUS
Status: DISCONTINUED | OUTPATIENT
Start: 2025-06-10 | End: 2025-06-10 | Stop reason: HOSPADM

## 2025-06-10 RX ORDER — HYDROMORPHONE HYDROCHLORIDE 1 MG/ML
0.4 INJECTION, SOLUTION INTRAMUSCULAR; INTRAVENOUS; SUBCUTANEOUS EVERY 5 MIN PRN
Status: DISCONTINUED | OUTPATIENT
Start: 2025-06-10 | End: 2025-06-10 | Stop reason: HOSPADM

## 2025-06-10 RX ORDER — KETOROLAC TROMETHAMINE 30 MG/ML
INJECTION, SOLUTION INTRAMUSCULAR; INTRAVENOUS
Status: COMPLETED
Start: 2025-06-10 | End: 2025-06-10

## 2025-06-10 RX ORDER — NALOXONE HYDROCHLORIDE 0.4 MG/ML
80 INJECTION, SOLUTION INTRAMUSCULAR; INTRAVENOUS; SUBCUTANEOUS AS NEEDED
Status: DISCONTINUED | OUTPATIENT
Start: 2025-06-10 | End: 2025-06-10 | Stop reason: HOSPADM

## 2025-06-10 RX ORDER — GLYCOPYRROLATE 0.2 MG/ML
0.2 INJECTION, SOLUTION INTRAMUSCULAR; INTRAVENOUS ONCE
Status: COMPLETED | OUTPATIENT
Start: 2025-06-10 | End: 2025-06-10

## 2025-06-10 RX ORDER — HYDROMORPHONE HYDROCHLORIDE 1 MG/ML
0.6 INJECTION, SOLUTION INTRAMUSCULAR; INTRAVENOUS; SUBCUTANEOUS EVERY 5 MIN PRN
Status: DISCONTINUED | OUTPATIENT
Start: 2025-06-10 | End: 2025-06-10 | Stop reason: HOSPADM

## 2025-06-10 RX ORDER — METHOHEXITAL IN WATER/PF 100MG/10ML
SYRINGE (ML) INTRAVENOUS AS NEEDED
Status: DISCONTINUED | OUTPATIENT
Start: 2025-06-10 | End: 2025-06-10 | Stop reason: SURG

## 2025-06-10 RX ORDER — METHOHEXITAL IN WATER/PF 100MG/10ML
SYRINGE (ML) INTRAVENOUS
Status: COMPLETED
Start: 2025-06-10 | End: 2025-06-10

## 2025-06-10 RX ORDER — GLYCOPYRROLATE 0.2 MG/ML
INJECTION, SOLUTION INTRAMUSCULAR; INTRAVENOUS
Status: COMPLETED
Start: 2025-06-10 | End: 2025-06-10

## 2025-06-10 RX ORDER — MIDAZOLAM HYDROCHLORIDE 1 MG/ML
1 INJECTION INTRAMUSCULAR; INTRAVENOUS EVERY 5 MIN PRN
Status: DISCONTINUED | OUTPATIENT
Start: 2025-06-10 | End: 2025-06-10 | Stop reason: HOSPADM

## 2025-06-10 RX ORDER — ONDANSETRON 2 MG/ML
4 INJECTION INTRAMUSCULAR; INTRAVENOUS ONCE
Status: COMPLETED | OUTPATIENT
Start: 2025-06-10 | End: 2025-06-10

## 2025-06-10 RX ORDER — KETOROLAC TROMETHAMINE 30 MG/ML
30 INJECTION, SOLUTION INTRAMUSCULAR; INTRAVENOUS ONCE
Status: COMPLETED | OUTPATIENT
Start: 2025-06-10 | End: 2025-06-10

## 2025-06-10 RX ADMIN — METHOHEXITAL IN WATER/PF 100 MG: 100MG/10ML SYRINGE (ML) INTRAVENOUS at 08:20:00

## 2025-06-10 RX ADMIN — SODIUM CHLORIDE, SODIUM LACTATE, POTASSIUM CHLORIDE, CALCIUM CHLORIDE: 600; 310; 30; 20 INJECTION, SOLUTION INTRAVENOUS at 08:16:00

## 2025-06-10 RX ADMIN — GLYCOPYRROLATE 0.2 MG: 0.2 INJECTION, SOLUTION INTRAMUSCULAR; INTRAVENOUS at 06:26:00

## 2025-06-10 RX ADMIN — ONDANSETRON 4 MG: 2 INJECTION INTRAMUSCULAR; INTRAVENOUS at 06:27:00

## 2025-06-10 RX ADMIN — KETOROLAC TROMETHAMINE 30 MG: 30 INJECTION, SOLUTION INTRAMUSCULAR; INTRAVENOUS at 06:24:00

## 2025-06-10 RX ADMIN — LIDOCAINE HYDROCHLORIDE 20 MG: 10 INJECTION, SOLUTION EPIDURAL; INFILTRATION; INTRACAUDAL; PERINEURAL at 08:20:00

## 2025-06-10 RX ADMIN — SODIUM CHLORIDE, SODIUM LACTATE, POTASSIUM CHLORIDE, CALCIUM CHLORIDE: 600; 310; 30; 20 INJECTION, SOLUTION INTRAVENOUS at 06:24:00

## 2025-06-10 RX ADMIN — SODIUM CHLORIDE, SODIUM LACTATE, POTASSIUM CHLORIDE, CALCIUM CHLORIDE: 600; 310; 30; 20 INJECTION, SOLUTION INTRAVENOUS at 08:27:00

## 2025-06-10 NOTE — ANESTHESIA POSTPROCEDURE EVALUATION
[facility]    Zeus Polk Patient Status:  Outpatient   Age/Gender 29 year old female MRN [id number]   Location [facility] POST ANESTHESIA CARE UNIT Attending Kath Tineo MD   Hosp Day # 0 PCP Lucrecia Crain       Anesthesia Post-op Note        Procedure Summary       Date: 06/10/25 Room / Location: [facility] Post Anesthesia Care Unit    Anesthesia Start: 0816 Anesthesia Stop: 0827    Procedure: ECT(BEDSIDE) Diagnosis: Unspecified mood (affective) disorder    Scheduled Providers:  Anesthesiologist: Shiva Esteves MD    Anesthesia Type: general ASA Status: 3            Anesthesia Type: general    Vitals Value Taken Time   /83 06/10/25 08:27   Temp  06/10/25 08:27   Pulse 81 06/10/25 08:27   Resp 16 06/10/25 08:27   SpO2 98 06/10/25 08:27           Patient Location: PACU    Anesthesia Type: general    Airway Patency: patent    Postop Pain Control: adequate    Mental Status: mildly sedated but able to meaningfully participate in the post-anesthesia evaluation    Nausea/Vomiting: none    Cardiopulmonary/Hydration status: stable euvolemic    Complications: no apparent anesthesia related complications    Postop vital signs: stable    Dental Exam: Unchanged from Preop

## 2025-06-10 NOTE — PROGRESS NOTES
[facility]  ECT History & Physical    Zeus Polk Patient Status:  Outpatient   Age/Gender 29 year old female MRN [id number]   Location [facility] POST ANESTHESIA CARE UNIT Attending Kath Tineo MD   Hosp Day # 0 PCP Lucrecia Crain     Date of Service: 6/10/2025    Diagnosis:  Major Depression Recurrent Severe Without Psychotic Features. F33.2    Procedure:  Bifrontal    HPI: Variable course with currently more depressed mood.  No physical complaints      Medical History:  Past Medical History[1]    Surgical History:  Past Surgical History[2]    Family History:  Family History[3]    Social History:  Social History     Socioeconomic History    Marital status: Single     Spouse name: Not on file    Number of children: Not on file    Years of education: Not on file    Highest education level: Not on file   Occupational History    Not on file   Tobacco Use    Smoking status: Never    Smokeless tobacco: Never   Vaping Use    Vaping status: Never Used   Substance and Sexual Activity    Alcohol use: Yes     Comment: social drinking    Drug use: No    Sexual activity: Not on file   Other Topics Concern    Caffeine Concern Not Asked    Exercise Not Asked    Seat Belt Not Asked    Special Diet Not Asked    Stress Concern Not Asked    Weight Concern Not Asked   Social History Narrative    Not on file     Social Drivers of Health     Food Insecurity: No Food Insecurity (5/8/2025)    NCSS - Food Insecurity     Worried About Running Out of Food in the Last Year: No     Ran Out of Food in the Last Year: No   Transportation Needs: No Transportation Needs (5/8/2025)    NCSS - Transportation     Lack of Transportation: No   Housing Stability: Unknown (5/8/2025)    NCSS - Housing/Utilities     Has Housing: Yes     Worried About Losing Housing: No     Unable to Get Utilities: Not on file       ROS:  unremarkable    Physical Exam:   Ht 67\"   LMP 05/15/2025 (Exact Date)   BMI 41.32 kg/m²     General  Appearance:    Alert, cooperative, no distress, appears stated age   Head:    Normocephalic, without obvious abnormality, atraumatic   Eyes:    PERRL, conjunctiva/corneas clear, EOM's intact   Nose:   Nares normal, septum midline, mucosa normal, no drainage    or sinus tenderness   Throat:   Lips, mucosa, and tongue normal; teeth and gums normal   Neck:   Supple, symmetrical, trachea midline   Lungs:     Clear to auscultation bilaterally, respirations unlabored    Heart:    Regular rate and rhythm, S1 and S2 normal, no murmur, rub or gallop   Abdomen:     Soft, non-tender, bowel sounds active all four quadrants,     no masses, no organomegaly   Extremities:   Extremities normal, atraumatic, no cyanosis or edema   Pulses:   2+ and symmetric all extremities   Skin:   Skin color, texture, turgor normal, no rashes or lesions   Neurologic:   CNII-XII intact, normal strength, sensation and reflexes     throughout     Impressions & Plans:    Diagnosis:  Major Depression Recurrent Severe Without Psychotic Features. F33.2    Procedure:  Bifrontal    I have discussed the risks and benefits and alternatives with the patient/family.  They understand and agree to proceed with plan of care.    Kath Tineo MD  6/10/2025\         [1]   Past Medical History:   ADHD    Anxiety    Bipolar disorder (HCC)    Depression    Essential hypertension    High blood pressure    OCD (obsessive compulsive disorder)    Schizophrenia (HCC)   [2]   Past Surgical History:  Procedure Laterality Date    Oral surgery     [3]   Family History  Family history unknown: Yes

## 2025-06-10 NOTE — PROGRESS NOTES
[facility]  ECT Procedure Note    Zeus Polk Patient Status:  Outpatient   Age/Gender 29 year old female   MRN [id number]    Location [facility] POST ANESTHESIA CARE UNIT Attending No att. providers found   Hosp Day # 0 PCP Lucrecia Crain     ECT Number: #10 total-# 4 bifrontal    Diagnosis: Major Depression Recurrent Severe Without Psychotic Features. F33.2    Type of ECT:  Bifrontal    Place of Service:  Inpatient    Settings:   1.  Energy Percentage: 100%    2.  Program:  Pulse Width  1.5    Pre-ECT Evaluation    Symptoms:      Prior to procedure, reviewed with treatment team correct patient, time of procedure and type of ECT.  Also reviewed with anesthesia pre-ECT medications    Patient quite dysphoric and somewhat irritable stating she has been on a downward spiral since the weekend was an increase in depression and suicidal thoughts.  She now notes that she has a plan to kill herself with a knife which she did not have before.  Patient has a variable course with periods where she is more stable and less suicidal, periods where she cannot remember anything due to the ECT and is pleased about it, and periods Where she is more depressed and suicidal such as today.  No side effects have improved with bifrontal treatment      The patient continues to retain capacity to consent for ECT.    Risk/Benefits:  Discussed with patient side effects of ECT including headache, teeth, jaw, cardiac, pulmonary, NPO, aspiration, allergic reactions, anesthetic reactions, musculoskeletal, neurologic, morbidity/mortality, potential lack of efficacy, unilateral/bilateral ECT, relapse/maintenance issues, cognitive risks including memory, concentration, cognition, and other risks.    Side Effects:  Mild memory complaint    Exam:  Mood: depressed and dysphoric anxious irritable  Affect: Congruent and Blunted  Memory:  intact immediate, recent, remote and as evidenced by ability to present  consistent history  Concentration:   focused and attentive and as assessed by  ability to concentrate on our conversation  Suicidal ideation: Occasional suicidal thoughts with plan and no intent    Patient Monitored:  B/P, EKG, EEG, Pulse Ox, Left Ankle Cuff    Pre-ECT Medications:   Robinul 0.2 mg IV, Toradol 30 mg IV, and Zofran 4 mg IV     ECT Medications: Lidocaine 20 mg IV pretreatment.  Anesthetic  Brevital 100 mg IV and Succinylcholine 70 mg IV and use of soft bite block    Seizure Duration:  Motor: 42 seconds       EE seconds    Post-ECT Condition:  Treatment unremarkable    ECT Medications: Propofol 50 mg IV    Kath Tineo    6/10/2025

## 2025-06-10 NOTE — ANESTHESIA PREPROCEDURE EVALUATION
PRE-OP EVALUATION    Patient Name: Zeus Polk    Admit Diagnosis: Depression, unspecified depression type [F32.A]    Pre-op Diagnosis: * No pre-op diagnosis entered *        Anesthesia Procedure: ECT(BEDSIDE)    * No surgeons found in log *    Pre-op vitals reviewed.  Temp: 98 °F (36.7 °C)  Pulse: 99  Resp: 16  BP: 143/93  SpO2: 100 %  Body mass index is 41.32 kg/m².    Current medications reviewed.  Hospital Medications:  Current Medications[1]    Outpatient Medications:   Prescriptions Prior to Admission[2]    Allergies: Seasonal      Anesthesia Evaluation    Patient summary reviewed.    Anesthetic Complications           GI/Hepatic/Renal    Negative GI/hepatic/renal ROS.                             Cardiovascular                  (+) hypertension                                     Endo/Other    Negative endo/other ROS.                              Pulmonary    Negative pulmonary ROS.                       Neuro/Psych      (+) depression                        Patient Active Problem List:     Asymmetric tonsils     Unspecified mood (affective) disorder     OCD (obsessive compulsive disorder)     a     Attention deficit hyperactivity disorder (ADHD), combined type     Learning disorder involving mathematics     Binge eating disorder           Past Surgical History[3]  Social Hx on file[4]  History   Drug Use No     Available pre-op labs reviewed.  Lab Results   Component Value Date    WBC 7.3 05/06/2025    RBC 4.54 05/06/2025    HGB 13.5 05/06/2025    HCT 38.5 05/06/2025    MCV 84.8 05/06/2025    MCH 29.7 05/06/2025    MCHC 35.1 05/06/2025    RDW 13.0 05/06/2025    .0 05/06/2025     Lab Results   Component Value Date     05/08/2025    K 4.1 05/08/2025     05/08/2025    CO2 27.0 05/08/2025    BUN 13 05/08/2025    CREATSERUM 1.22 (H) 05/08/2025    GLU 91 05/08/2025    CA 9.1 05/08/2025            Airway      Mallampati: II  Mouth opening: >3 FB  TM distance: > 6 cm  Neck ROM: full  Cardiovascular    Cardiovascular exam normal.         Dental    Dentition appears grossly intact         Pulmonary    Pulmonary exam normal.                 Other findings              ASA: 3   Plan: general  NPO status verified and patient meets guidelines.        Comment: I explained the intrinsic risks of general anesthesia for ECT procedure, including PONV, muscle soreness, dental /lip injury, recall, and other serious but rare complications (life-threatening cardiopulmonary events). All questions were answered and patient demonstrated understanding of realistic expectations and risks of undergoing anesthesia. Informed consent was signed by patient.       Plan/risks discussed with: patient                Present on Admission:  **None**             [1]    lactated ringers infusion   Intravenous Continuous    [COMPLETED] glycopyrrolate (Robinul) 0.2 MG/ML injection 0.2 mg  0.2 mg Intravenous Once    [COMPLETED] ketorolac (Toradol) 30 MG/ML injection 30 mg  30 mg Intravenous Once    [COMPLETED] ondansetron (Zofran) 4 MG/2ML injection 4 mg  4 mg Intravenous Once    [COMPLETED] methohexital (BrevITAL) 100 mg/10mL IV syringe        [COMPLETED] ondansetron (Zofran) 4 MG/2ML injection        [COMPLETED] ketorolac (Toradol) 30 MG/ML injection        [COMPLETED] glycopyrrolate (Robinul) 0.2 MG/ML injection        OLANZapine (ZyPREXA) tab 2.5 mg  2.5 mg Oral Q4H PRN    Or    OLANZapine (Zyprexa) IM injection 2.5 mg  2.5 mg Intramuscular Q4H PRN    benztropine (Cogentin) tab 2 mg  2 mg Oral Q4H PRN    Or    benztropine mesylate (Cogentin) 1 mg/mL injection 2 mg  2 mg Intramuscular Q4H PRN    LORazepam (Ativan) tab 1 mg  1 mg Oral Q4H PRN    Or    LORazepam (Ativan) 2 mg/mL injection 1 mg  1 mg Intramuscular Q4H PRN    propranolol (Inderal) tab 20 mg  20 mg Oral TID PRN    ibuprofen (Motrin) tab 400 mg  400 mg Oral Q6H PRN    acetaminophen (Tylenol Extra Strength) tab 1,000 mg  1,000 mg Oral Daily PRN    magnesium hydroxide  (Milk of Magnesia) 400 MG/5ML oral suspension 30 mL  30 mL Oral Daily PRN    guaiFENesin (Robitussin) 100 MG/5 ML oral liquid 200 mg  200 mg Oral Q4H PRN    QUEtiapine (SEROquel) tab 300 mg  300 mg Oral Nightly    QUEtiapine (SEROquel) tab 25 mg  25 mg Oral Daily PRN    alum-mag hydroxide-simethicone (Maalox) 200-200-20 MG/5ML oral suspension 30 mL  30 mL Oral Q4H PRN    acetaminophen (Tylenol) tab 325 mg  325 mg Oral Q4H PRN    Or    acetaminophen (Tylenol) tab 650 mg  650 mg Oral Q4H PRN    albuterol (Ventolin HFA) 108 (90 Base) MCG/ACT inhaler 2 puff  2 puff Inhalation Q6H PRN    amLODIPine (Norvasc) tab 5 mg  5 mg Oral Daily    cetirizine (ZyrTEC) tab 10 mg  10 mg Oral Nightly   [2] (Not in a hospital admission)   [3]   Past Surgical History:  Procedure Laterality Date    Oral surgery     [4]   Social History  Socioeconomic History    Marital status: Single   Tobacco Use    Smoking status: Never    Smokeless tobacco: Never   Vaping Use    Vaping status: Never Used   Substance and Sexual Activity    Alcohol use: Yes     Comment: social drinking    Drug use: No

## 2025-06-12 ENCOUNTER — ANESTHESIA EVENT (OUTPATIENT)
Dept: POSTOP/PACU | Facility: HOSPITAL | Age: 30
End: 2025-06-12
Payer: COMMERCIAL

## 2025-06-12 ENCOUNTER — ANESTHESIA (OUTPATIENT)
Dept: POSTOP/PACU | Facility: HOSPITAL | Age: 30
End: 2025-06-12
Payer: COMMERCIAL

## 2025-06-12 RX ORDER — METHOHEXITAL IN WATER/PF 100MG/10ML
SYRINGE (ML) INTRAVENOUS AS NEEDED
Status: DISCONTINUED | OUTPATIENT
Start: 2025-06-12 | End: 2025-06-12 | Stop reason: SURG

## 2025-06-12 RX ORDER — LIDOCAINE HYDROCHLORIDE 10 MG/ML
INJECTION, SOLUTION EPIDURAL; INFILTRATION; INTRACAUDAL; PERINEURAL AS NEEDED
Status: DISCONTINUED | OUTPATIENT
Start: 2025-06-12 | End: 2025-06-12 | Stop reason: SURG

## 2025-06-12 RX ADMIN — LIDOCAINE HYDROCHLORIDE 20 MG: 10 INJECTION, SOLUTION EPIDURAL; INFILTRATION; INTRACAUDAL; PERINEURAL at 07:52:00

## 2025-06-12 RX ADMIN — METHOHEXITAL IN WATER/PF 100 MG: 100MG/10ML SYRINGE (ML) INTRAVENOUS at 07:52:00

## 2025-06-12 NOTE — ANESTHESIA PREPROCEDURE EVALUATION
PRE-OP EVALUATION    Patient Name: Zeus Polk    Admit Diagnosis: Depression, unspecified depression type [F32.A]    Pre-op Diagnosis: * No pre-op diagnosis entered *        Anesthesia Procedure: ECT(BEDSIDE)    * No surgeons found in log *    Pre-op vitals reviewed.  Temp: 98 °F (36.7 °C)  Resp: 18  BP: 143/97  SpO2: 98 %  Body mass index is 41.32 kg/m².    Current medications reviewed.  Hospital Medications:  Current Medications[1]    Outpatient Medications:   Prescriptions Prior to Admission[2]    Allergies: Seasonal      Anesthesia Evaluation    Patient summary reviewed.    Anesthetic Complications           GI/Hepatic/Renal    Negative GI/hepatic/renal ROS.                             Cardiovascular                  (+) hypertension                                     Endo/Other    Negative endo/other ROS.                              Pulmonary    Negative pulmonary ROS.                       Neuro/Psych      (+) depression                        Patient Active Problem List:     Asymmetric tonsils     Unspecified mood (affective) disorder     OCD (obsessive compulsive disorder)     a     Attention deficit hyperactivity disorder (ADHD), combined type     Learning disorder involving mathematics     Binge eating disorder           Past Surgical History[3]  Social Hx on file[4]  History   Drug Use No     Available pre-op labs reviewed.  Lab Results   Component Value Date    WBC 7.3 05/06/2025    RBC 4.54 05/06/2025    HGB 13.5 05/06/2025    HCT 38.5 05/06/2025    MCV 84.8 05/06/2025    MCH 29.7 05/06/2025    MCHC 35.1 05/06/2025    RDW 13.0 05/06/2025    .0 05/06/2025     Lab Results   Component Value Date     05/08/2025    K 4.1 05/08/2025     05/08/2025    CO2 27.0 05/08/2025    BUN 13 05/08/2025    CREATSERUM 1.22 (H) 05/08/2025    GLU 91 05/08/2025    CA 9.1 05/08/2025            Airway      Mallampati: II  Mouth opening: >3 FB  TM distance: > 6 cm  Neck ROM: full  Cardiovascular    Cardiovascular exam normal.         Dental    Dentition appears grossly intact         Pulmonary    Pulmonary exam normal.                 Other findings              ASA: 3   Plan: general  NPO status verified and patient meets guidelines.        Comment: I explained the intrinsic risks of general anesthesia for ECT procedure, including PONV, muscle soreness, dental /lip injury, recall, and other serious but rare complications (life-threatening cardiopulmonary events). All questions were answered and patient demonstrated understanding of realistic expectations and risks of undergoing anesthesia. Informed consent was signed by patient.       Plan/risks discussed with: patient                Present on Admission:  **None**             [1]    lactated ringers infusion   Intravenous Continuous    [COMPLETED] glycopyrrolate (Robinul) 0.2 MG/ML injection 0.2 mg  0.2 mg Intravenous Once    [COMPLETED] ketorolac (Toradol) 30 MG/ML injection 30 mg  30 mg Intravenous Once    [COMPLETED] ondansetron (Zofran) 4 MG/2ML injection 4 mg  4 mg Intravenous Once    [COMPLETED] methohexital (BrevITAL) 100 mg/10mL IV syringe        [COMPLETED] ondansetron (Zofran) 4 MG/2ML injection        [COMPLETED] ketorolac (Toradol) 30 MG/ML injection        [COMPLETED] glycopyrrolate (Robinul) 0.2 MG/ML injection        OLANZapine (ZyPREXA) tab 2.5 mg  2.5 mg Oral Q4H PRN    Or    OLANZapine (Zyprexa) IM injection 2.5 mg  2.5 mg Intramuscular Q4H PRN    benztropine (Cogentin) tab 2 mg  2 mg Oral Q4H PRN    Or    benztropine mesylate (Cogentin) 1 mg/mL injection 2 mg  2 mg Intramuscular Q4H PRN    [Held by provider] LORazepam (Ativan) tab 1 mg  1 mg Oral Q4H PRN    Or    [Held by provider] LORazepam (Ativan) 2 mg/mL injection 1 mg  1 mg Intramuscular Q4H PRN    propranolol (Inderal) tab 20 mg  20 mg Oral TID PRN    ibuprofen (Motrin) tab 400 mg  400 mg Oral Q6H PRN    acetaminophen (Tylenol Extra Strength) tab 1,000 mg  1,000 mg  Oral Daily PRN    magnesium hydroxide (Milk of Magnesia) 400 MG/5ML oral suspension 30 mL  30 mL Oral Daily PRN    guaiFENesin (Robitussin) 100 MG/5 ML oral liquid 200 mg  200 mg Oral Q4H PRN    QUEtiapine (SEROquel) tab 300 mg  300 mg Oral Nightly    QUEtiapine (SEROquel) tab 25 mg  25 mg Oral Daily PRN    alum-mag hydroxide-simethicone (Maalox) 200-200-20 MG/5ML oral suspension 30 mL  30 mL Oral Q4H PRN    acetaminophen (Tylenol) tab 325 mg  325 mg Oral Q4H PRN    Or    acetaminophen (Tylenol) tab 650 mg  650 mg Oral Q4H PRN    albuterol (Ventolin HFA) 108 (90 Base) MCG/ACT inhaler 2 puff  2 puff Inhalation Q6H PRN    amLODIPine (Norvasc) tab 5 mg  5 mg Oral Daily    cetirizine (ZyrTEC) tab 10 mg  10 mg Oral Nightly   [2] (Not in a hospital admission)   [3]   Past Surgical History:  Procedure Laterality Date    Oral surgery     [4]   Social History  Socioeconomic History    Marital status: Single   Tobacco Use    Smoking status: Never    Smokeless tobacco: Never   Vaping Use    Vaping status: Never Used   Substance and Sexual Activity    Alcohol use: Yes     Comment: social drinking    Drug use: No

## 2025-06-12 NOTE — ANESTHESIA POSTPROCEDURE EVALUATION
[facility]    Zeus Polk Patient Status:  Outpatient   Age/Gender 29 year old female MRN [id number]   Location [facility] POST ANESTHESIA CARE UNIT Attending Kath Tineo MD   Hosp Day # 0 PCP Lucrecia Crain       Anesthesia Post-op Note        Procedure Summary       Date: 06/12/25 Room / Location: [facility] Post Anesthesia Care Unit    Anesthesia Start: 0751 Anesthesia Stop: 0803    Procedure: ECT(BEDSIDE) Diagnosis: Unspecified mood (affective) disorder    Scheduled Providers:  Anesthesiologist: Tc Rivas MD    Anesthesia Type: general ASA Status: 3            Anesthesia Type: general    Vitals Value Taken Time   /77 06/12/25 08:03   Temp  06/12/25 08:03   Pulse 78 06/12/25 08:03   Resp 16 06/12/25 08:03   SpO2 98 06/12/25 08:03           Patient Location: PACU    Anesthesia Type: general    Airway Patency: patent    Postop Pain Control: adequate    Mental Status: Other    Nausea/Vomiting: none    Cardiopulmonary/Hydration status: stable euvolemic    Complications: no apparent anesthesia related complications    Postop vital signs: stable    Dental Exam: Unchanged from Preop

## 2025-06-16 ENCOUNTER — HOSPITAL ENCOUNTER (OUTPATIENT)
Dept: POSTOP/PACU | Facility: HOSPITAL | Age: 30
Discharge: HOME | End: 2025-06-16
Attending: Other
Payer: COMMERCIAL

## 2025-06-16 ENCOUNTER — ANESTHESIA EVENT (OUTPATIENT)
Dept: POSTOP/PACU | Facility: HOSPITAL | Age: 30
End: 2025-06-16
Payer: COMMERCIAL

## 2025-06-16 ENCOUNTER — ANESTHESIA (OUTPATIENT)
Dept: POSTOP/PACU | Facility: HOSPITAL | Age: 30
End: 2025-06-16
Payer: COMMERCIAL

## 2025-06-16 VITALS
SYSTOLIC BLOOD PRESSURE: 136 MMHG | BODY MASS INDEX: 41 KG/M2 | OXYGEN SATURATION: 100 % | DIASTOLIC BLOOD PRESSURE: 93 MMHG | RESPIRATION RATE: 14 BRPM | HEIGHT: 67 IN | HEART RATE: 91 BPM | TEMPERATURE: 99 F

## 2025-06-16 DIAGNOSIS — F39 UNSPECIFIED MOOD (AFFECTIVE) DISORDER: ICD-10-CM

## 2025-06-16 RX ORDER — HYDROMORPHONE HYDROCHLORIDE 1 MG/ML
0.2 INJECTION, SOLUTION INTRAMUSCULAR; INTRAVENOUS; SUBCUTANEOUS EVERY 5 MIN PRN
Status: ACTIVE | OUTPATIENT
Start: 2025-06-16 | End: 2025-06-16

## 2025-06-16 RX ORDER — KETOROLAC TROMETHAMINE 30 MG/ML
30 INJECTION, SOLUTION INTRAMUSCULAR; INTRAVENOUS ONCE
Status: COMPLETED | OUTPATIENT
Start: 2025-06-16 | End: 2025-06-16

## 2025-06-16 RX ORDER — ONDANSETRON 2 MG/ML
4 INJECTION INTRAMUSCULAR; INTRAVENOUS ONCE
Status: COMPLETED | OUTPATIENT
Start: 2025-06-16 | End: 2025-06-16

## 2025-06-16 RX ORDER — NALOXONE HYDROCHLORIDE 0.4 MG/ML
0.08 INJECTION, SOLUTION INTRAMUSCULAR; INTRAVENOUS; SUBCUTANEOUS AS NEEDED
Status: ACTIVE | OUTPATIENT
Start: 2025-06-16 | End: 2025-06-16

## 2025-06-16 RX ORDER — SODIUM CHLORIDE, SODIUM LACTATE, POTASSIUM CHLORIDE, CALCIUM CHLORIDE 600; 310; 30; 20 MG/100ML; MG/100ML; MG/100ML; MG/100ML
INJECTION, SOLUTION INTRAVENOUS CONTINUOUS
Status: DISCONTINUED | OUTPATIENT
Start: 2025-06-16 | End: 2025-06-18

## 2025-06-16 RX ORDER — GLYCOPYRROLATE 0.2 MG/ML
INJECTION, SOLUTION INTRAMUSCULAR; INTRAVENOUS
Status: COMPLETED
Start: 2025-06-16 | End: 2025-06-16

## 2025-06-16 RX ORDER — METHOHEXITAL IN WATER/PF 100MG/10ML
SYRINGE (ML) INTRAVENOUS
Status: COMPLETED
Start: 2025-06-16 | End: 2025-06-16

## 2025-06-16 RX ORDER — METHOHEXITAL IN WATER/PF 100MG/10ML
SYRINGE (ML) INTRAVENOUS AS NEEDED
Status: DISCONTINUED | OUTPATIENT
Start: 2025-06-16 | End: 2025-06-16 | Stop reason: SURG

## 2025-06-16 RX ORDER — HYDROMORPHONE HYDROCHLORIDE 1 MG/ML
0.4 INJECTION, SOLUTION INTRAMUSCULAR; INTRAVENOUS; SUBCUTANEOUS EVERY 5 MIN PRN
Status: ACTIVE | OUTPATIENT
Start: 2025-06-16 | End: 2025-06-16

## 2025-06-16 RX ORDER — HYDROMORPHONE HYDROCHLORIDE 1 MG/ML
0.6 INJECTION, SOLUTION INTRAMUSCULAR; INTRAVENOUS; SUBCUTANEOUS EVERY 5 MIN PRN
Status: ACTIVE | OUTPATIENT
Start: 2025-06-16 | End: 2025-06-16

## 2025-06-16 RX ORDER — ONDANSETRON 2 MG/ML
INJECTION INTRAMUSCULAR; INTRAVENOUS
Status: COMPLETED
Start: 2025-06-16 | End: 2025-06-16

## 2025-06-16 RX ORDER — KETOROLAC TROMETHAMINE 30 MG/ML
INJECTION, SOLUTION INTRAMUSCULAR; INTRAVENOUS
Status: COMPLETED
Start: 2025-06-16 | End: 2025-06-16

## 2025-06-16 RX ORDER — GLYCOPYRROLATE 0.2 MG/ML
0.2 INJECTION, SOLUTION INTRAMUSCULAR; INTRAVENOUS ONCE
Status: COMPLETED | OUTPATIENT
Start: 2025-06-16 | End: 2025-06-16

## 2025-06-16 RX ORDER — LIDOCAINE HYDROCHLORIDE 10 MG/ML
INJECTION, SOLUTION EPIDURAL; INFILTRATION; INTRACAUDAL; PERINEURAL AS NEEDED
Status: DISCONTINUED | OUTPATIENT
Start: 2025-06-16 | End: 2025-06-16 | Stop reason: SURG

## 2025-06-16 RX ADMIN — ONDANSETRON 4 MG: 2 INJECTION INTRAMUSCULAR; INTRAVENOUS at 07:00:00

## 2025-06-16 RX ADMIN — SODIUM CHLORIDE, SODIUM LACTATE, POTASSIUM CHLORIDE, CALCIUM CHLORIDE: 600; 310; 30; 20 INJECTION, SOLUTION INTRAVENOUS at 07:05:00

## 2025-06-16 RX ADMIN — LIDOCAINE HYDROCHLORIDE 20 MG: 10 INJECTION, SOLUTION EPIDURAL; INFILTRATION; INTRACAUDAL; PERINEURAL at 07:54:00

## 2025-06-16 RX ADMIN — METHOHEXITAL IN WATER/PF 100 MG: 100MG/10ML SYRINGE (ML) INTRAVENOUS at 07:54:00

## 2025-06-16 RX ADMIN — GLYCOPYRROLATE 0.2 MG: 0.2 INJECTION, SOLUTION INTRAMUSCULAR; INTRAVENOUS at 07:00:00

## 2025-06-16 RX ADMIN — KETOROLAC TROMETHAMINE 30 MG: 30 INJECTION, SOLUTION INTRAMUSCULAR; INTRAVENOUS at 07:05:00

## 2025-06-16 NOTE — PROGRESS NOTES
[facility] / [facility]  ECT History & Physical    Zeus Polk Patient Status:  Outpatient   Age/Gender 29 year old female MRN [id number]   Location [facility] POST ANESTHESIA CARE UNIT Attending Estuardo Correa MD   Hosp Day # 0 PCP Lucrecia Crain     Date of Service: 6/16/2025    Diagnosis:  Major Depression Recurrent Severe Without Psychotic Features. F33.2    Procedure:  Bitemporal    HPI: Improving in all areas.  No physical complaints      Medical History:  Past Medical History[1]    Surgical History:  Past Surgical History[2]    Family History:  Family History[3]    Social History:  Social History     Socioeconomic History    Marital status: Single     Spouse name: Not on file    Number of children: Not on file    Years of education: Not on file    Highest education level: Not on file   Occupational History    Not on file   Tobacco Use    Smoking status: Never    Smokeless tobacco: Never   Vaping Use    Vaping status: Never Used   Substance and Sexual Activity    Alcohol use: Yes     Comment: social drinking    Drug use: No    Sexual activity: Not on file   Other Topics Concern    Caffeine Concern Not Asked    Exercise Not Asked    Seat Belt Not Asked    Special Diet Not Asked    Stress Concern Not Asked    Weight Concern Not Asked   Social History Narrative    Not on file     Social Drivers of Health     Food Insecurity: No Food Insecurity (5/8/2025)    NCSS - Food Insecurity     Worried About Running Out of Food in the Last Year: No     Ran Out of Food in the Last Year: No   Transportation Needs: No Transportation Needs (5/8/2025)    NCSS - Transportation     Lack of Transportation: No   Housing Stability: Unknown (5/8/2025)    NCSS - Housing/Utilities     Has Housing: Yes     Worried About Losing Housing: No     Unable to Get Utilities: Not on file       ROS:  unremarkable    Physical Exam:   Ht 67\"   LMP 05/15/2025 (Exact Date)   BMI 41.32 kg/m²     General Appearance:    Alert,  cooperative, no distress, appears stated age   Head:    Normocephalic, without obvious abnormality, atraumatic   Eyes:    PERRL, conjunctiva/corneas clear, EOM's intact   Nose:   Nares normal, septum midline, mucosa normal, no drainage    or sinus tenderness   Throat:   Lips, mucosa, and tongue normal; teeth and gums normal   Neck:   Supple, symmetrical, trachea midline   Lungs:     Clear to auscultation bilaterally, respirations unlabored    Heart:    Regular rate and rhythm, S1 and S2 normal, no murmur, rub or gallop   Abdomen:     Soft, non-tender, bowel sounds active all four quadrants,     no masses, no organomegaly   Extremities:   Extremities normal, atraumatic, no cyanosis or edema   Pulses:   2+ and symmetric all extremities   Skin:   Skin color, texture, turgor normal, no rashes or lesions   Neurologic:   CNII-XII intact, normal strength, sensation and reflexes     throughout     Impressions & Plans:    Diagnosis:  Major Depression Recurrent Severe Without Psychotic Features. F33.2    Procedure:  Bitemporal    I have discussed the risks and benefits and alternatives with the patient/family.  They understand and agree to proceed with plan of care.    Kath Tineo MD  6/16/2025         [1]   Past Medical History:   ADHD    Anxiety    Bipolar disorder (HCC)    Depression    Essential hypertension    High blood pressure    OCD (obsessive compulsive disorder)    Schizophrenia (HCC)   [2]   Past Surgical History:  Procedure Laterality Date    Oral surgery     [3]   Family History  Family history unknown: Yes

## 2025-06-16 NOTE — ANESTHESIA POSTPROCEDURE EVALUATION
Home Care is calling regarding an established patient with [facility].       Requesting orders from: Ivan Baeza  Provider is following patient: Yes  Is this a 60-day recertification request?  No    Orders Requested    Skilled Nursing  Request for initial certification (first set of orders)   Frequency:  1x/wk for 1 wks  then 2x/wk for 8 wks  2 prn    Home care would like diagnoses for patients wounds.  1) sacral area- stage 3 pressure ulcer  2) trauma wound( skin tear) on right glut  3) stage 3 pressure injury on the right glut  4) left glut stage 2 pressure ulcer  5) left lateral thigh area stage 3 pressure ulcer  6) right lateral ankle stage 2 pressure ulcer.    Confirmed ok to leave a detailed message with call back.  Contact information confirmed and updated as needed.    Ines Martin RN   [facility]    Zeus Polk Patient Status:  Outpatient   Age/Gender 29 year old female MRN [id number]   Location [facility] POST ANESTHESIA CARE UNIT Attending Estuardo Correa MD   Hosp Day # 0 PCP Lucrecia Crain       Anesthesia Post-op Note        Procedure Summary       Date: 06/16/25 Room / Location: [facility] Post Anesthesia Care Unit    Anesthesia Start: 0751 Anesthesia Stop: 0804    Procedure: ECT(BEDSIDE) Diagnosis: Unspecified mood (affective) disorder    Scheduled Providers:  Anesthesiologist: Suleiman Davalos MD    Anesthesia Type: general ASA Status: 3            Anesthesia Type: general    Vitals Value Taken Time   /93 06/16/25 08:04   Temp  06/16/25 08:05   Pulse 91 06/16/25 08:04   Resp 21 06/16/25 08:04   SpO2 92 % 06/16/25 08:04           Patient Location: PACU    Anesthesia Type: general    Airway Patency: patent    Postop Pain Control: adequate    Mental Status: mildly sedated but able to meaningfully participate in the post-anesthesia evaluation    Nausea/Vomiting: none    Cardiopulmonary/Hydration status: stable euvolemic    Complications: no apparent anesthesia related complications    Postop vital signs: stable    Dental Exam: Unchanged from Preop    Patient to be discharged from PACU when criteria met.

## 2025-06-16 NOTE — PROGRESS NOTES
[facility]  ECT Procedure Note    Zeus Polk Patient Status:  Outpatient   Age/Gender 29 year old female   MRN [id number]    Location [facility] POST ANESTHESIA CARE UNIT Attending No att. providers found   Hosp Day # 0 PCP Lucrecia Crain     ECT Number: #12 total-#2 bitemporal    Diagnosis: Major Depression Recurrent Severe Without Psychotic Features. F33.2    Type of ECT:  Bitemporal    Place of Service:  Inpatient    Settings:   1.  Energy Percentage: 100%    2.  Program:  DGX    Pre-ECT Evaluation    Symptoms:      Prior to procedure, reviewed with treatment team correct patient, time of procedure and type of ECT.  Also reviewed with anesthesia pre-ECT medications    Patient notes some increase in periods of dysphoria and anxiety through weekend but acknowledges that this may be related to upcoming discharge.  Patient feels bitemporal treatment has been more effective for her mood symptoms.  Suicidal thoughts have decreased to passive and occasional.  Cognitive side effects are manageable.  Patient would like to continue bitemporal ECT.    The patient continues to retain capacity to consent for ECT.    Risk/Benefits:  Discussed with patient side effects of ECT including headache, teeth, jaw, cardiac, pulmonary, NPO, aspiration, allergic reactions, anesthetic reactions, musculoskeletal, neurologic, morbidity/mortality, potential lack of efficacy, unilateral/bilateral ECT, relapse/maintenance issues, cognitive risks including memory, concentration, cognition, and other risks.    Side Effects:  Mild memory complaint    Exam:  Mood: less depressed and less anxious  Affect: Congruent and somewhat brighter  Memory:  intact immediate, recent, remote and as evidenced by ability to present consistent history  Concentration:   focused and attentive and as assessed by  ability to concentrate on our conversation  Suicidal ideation: Occasional passive suicidal thoughts    Patient  Monitored:  B/P, EKG, EEG, Pulse Ox, Left Ankle Cuff    Pre-ECT Medications:Robinul 0.2 mg IV, Toradol 30 mg IV, and Zofran 4 mg IV     ECT Medications:  Anesthetic  Brevital 100 mg IV and Succinylcholine 70 mg IV and use of soft bite block    Seizure Duration:  Motor: 46 seconds       EE seconds    Post-ECT Condition:  Treatment unremarkable    ECT Medications: Propofol 50 mg IV    Kath Tineo    2025

## 2025-06-16 NOTE — ANESTHESIA PREPROCEDURE EVALUATION
PRE-OP EVALUATION    Patient Name: Zeus Polk    Admit Diagnosis: Depression, unspecified depression type [F32.A]    Pre-op Diagnosis: * No pre-op diagnosis entered *        Anesthesia Procedure: ECT(BEDSIDE)    * No surgeons found in log *    Pre-op vitals reviewed.  Temp: 98.2 °F (36.8 °C)  Pulse: 91  Resp: 16  BP: 144/81  SpO2: 99 %  Body mass index is 41.32 kg/m².    Current medications reviewed.  Hospital Medications:  Current Medications[1]    Outpatient Medications:   Prescriptions Prior to Admission[2]    Allergies: Seasonal      Anesthesia Evaluation    Patient summary reviewed.    Anesthetic Complications           GI/Hepatic/Renal    Negative GI/hepatic/renal ROS.                             Cardiovascular                  (+) hypertension                                     Endo/Other    Negative endo/other ROS.                              Pulmonary    Negative pulmonary ROS.                       Neuro/Psych      (+) depression                        Patient Active Problem List:     Asymmetric tonsils     Unspecified mood (affective) disorder     OCD (obsessive compulsive disorder)     a     Attention deficit hyperactivity disorder (ADHD), combined type     Learning disorder involving mathematics     Binge eating disorder           Past Surgical History[3]  Social Hx on file[4]  History   Drug Use No     Available pre-op labs reviewed.  Lab Results   Component Value Date    WBC 7.3 05/06/2025    RBC 4.54 05/06/2025    HGB 13.5 05/06/2025    HCT 38.5 05/06/2025    MCV 84.8 05/06/2025    MCH 29.7 05/06/2025    MCHC 35.1 05/06/2025    RDW 13.0 05/06/2025    .0 05/06/2025     Lab Results   Component Value Date     05/08/2025    K 4.1 05/08/2025     05/08/2025    CO2 27.0 05/08/2025    BUN 13 05/08/2025    CREATSERUM 1.22 (H) 05/08/2025    GLU 91 05/08/2025    CA 9.1 05/08/2025            Airway      Mallampati: II  Mouth opening: >3 FB  TM distance: > 6 cm  Neck ROM: full  Cardiovascular    Cardiovascular exam normal.         Dental    Dentition appears grossly intact         Pulmonary    Pulmonary exam normal.                 Other findings              ASA: 3   Plan: general  NPO status verified and patient meets guidelines.        Comment: I explained the intrinsic risks of general anesthesia for ECT procedure, including PONV, muscle soreness, dental /lip injury, recall, and other serious but rare complications (life-threatening cardiopulmonary events). All questions were answered and patient demonstrated understanding of realistic expectations and risks of undergoing anesthesia. Informed consent was signed by patient.       Plan/risks discussed with: patient                Present on Admission:  **None**             [1]    lactated ringers infusion   Intravenous Continuous    glycopyrrolate (Robinul) 0.2 MG/ML injection 0.2 mg  0.2 mg Intravenous Once    ketorolac (Toradol) 30 MG/ML injection 30 mg  30 mg Intravenous Once    ondansetron (Zofran) 4 MG/2ML injection 4 mg  4 mg Intravenous Once    [COMPLETED] ondansetron (Zofran) 4 MG/2ML injection        [COMPLETED] ketorolac (Toradol) 30 MG/ML injection        [COMPLETED] glycopyrrolate (Robinul) 0.2 MG/ML injection        docusate sodium (Colace) cap 100 mg  100 mg Oral BID PRN    OLANZapine (ZyPREXA) tab 2.5 mg  2.5 mg Oral Q4H PRN    Or    OLANZapine (Zyprexa) IM injection 2.5 mg  2.5 mg Intramuscular Q4H PRN    benztropine (Cogentin) tab 2 mg  2 mg Oral Q4H PRN    Or    benztropine mesylate (Cogentin) 1 mg/mL injection 2 mg  2 mg Intramuscular Q4H PRN    [Held by provider] LORazepam (Ativan) tab 1 mg  1 mg Oral Q4H PRN    Or    [Held by provider] LORazepam (Ativan) 2 mg/mL injection 1 mg  1 mg Intramuscular Q4H PRN    ibuprofen (Motrin) tab 400 mg  400 mg Oral Q6H PRN    acetaminophen (Tylenol Extra Strength) tab 1,000 mg  1,000 mg Oral Daily PRN    magnesium hydroxide (Milk of Magnesia) 400 MG/5ML oral suspension 30 mL  30 mL  Oral Daily PRN    guaiFENesin (Robitussin) 100 MG/5 ML oral liquid 200 mg  200 mg Oral Q4H PRN    QUEtiapine (SEROquel) tab 300 mg  300 mg Oral Nightly    QUEtiapine (SEROquel) tab 25 mg  25 mg Oral Daily PRN    alum-mag hydroxide-simethicone (Maalox) 200-200-20 MG/5ML oral suspension 30 mL  30 mL Oral Q4H PRN    acetaminophen (Tylenol) tab 325 mg  325 mg Oral Q4H PRN    Or    acetaminophen (Tylenol) tab 650 mg  650 mg Oral Q4H PRN    albuterol (Ventolin HFA) 108 (90 Base) MCG/ACT inhaler 2 puff  2 puff Inhalation Q6H PRN    amLODIPine (Norvasc) tab 5 mg  5 mg Oral Daily    cetirizine (ZyrTEC) tab 10 mg  10 mg Oral Nightly   [2] (Not in a hospital admission)   [3]   Past Surgical History:  Procedure Laterality Date    Oral surgery     [4]   Social History  Socioeconomic History    Marital status: Single   Tobacco Use    Smoking status: Never    Smokeless tobacco: Never   Vaping Use    Vaping status: Never Used   Substance and Sexual Activity    Alcohol use: Yes     Comment: social drinking    Drug use: No

## 2025-06-17 VITALS — HEIGHT: 67 IN | BODY MASS INDEX: 41 KG/M2

## 2025-06-18 ENCOUNTER — HOSPITAL ENCOUNTER (OUTPATIENT)
Dept: POSTOP/PACU | Facility: HOSPITAL | Age: 30
Discharge: HOME | End: 2025-06-18
Attending: Other
Payer: COMMERCIAL

## 2025-06-22 RX ORDER — GLYCOPYRROLATE 0.2 MG/ML
0.2 INJECTION, SOLUTION INTRAMUSCULAR; INTRAVENOUS ONCE
OUTPATIENT
Start: 2025-06-22 | End: 2025-06-22

## 2025-06-22 RX ORDER — ONDANSETRON 2 MG/ML
4 INJECTION INTRAMUSCULAR; INTRAVENOUS ONCE
OUTPATIENT
Start: 2025-06-22 | End: 2025-06-22

## 2025-06-22 RX ORDER — KETOROLAC TROMETHAMINE 30 MG/ML
30 INJECTION, SOLUTION INTRAMUSCULAR; INTRAVENOUS ONCE
OUTPATIENT
Start: 2025-06-22 | End: 2025-06-22

## 2025-06-22 RX ORDER — SODIUM CHLORIDE, SODIUM LACTATE, POTASSIUM CHLORIDE, CALCIUM CHLORIDE 600; 310; 30; 20 MG/100ML; MG/100ML; MG/100ML; MG/100ML
INJECTION, SOLUTION INTRAVENOUS CONTINUOUS
OUTPATIENT
Start: 2025-06-22

## 2025-06-23 ENCOUNTER — HOSPITAL ENCOUNTER (OUTPATIENT)
Dept: POSTOP/PACU | Facility: HOSPITAL | Age: 30
Discharge: HOME | End: 2025-06-23
Attending: Other
Payer: COMMERCIAL

## 2025-06-23 NOTE — PROGRESS NOTES
Psychiatric progress note    Patient scheduled for ECT.  Patient no-show.  ECT schedules reported that the patient alluded to that she may not show up today.  They reported no specific safety issues.   Message left for patient today.

## 2025-06-26 ENCOUNTER — HOSPITAL ENCOUNTER (OUTPATIENT)
Dept: POSTOP/PACU | Facility: HOSPITAL | Age: 30
Discharge: HOME | End: 2025-06-26
Attending: Other
Payer: COMMERCIAL

## 2025-07-16 ENCOUNTER — ANESTHESIA EVENT (OUTPATIENT)
Dept: POSTOP/PACU | Facility: HOSPITAL | Age: 30
End: 2025-07-16
Payer: COMMERCIAL

## 2025-07-16 ENCOUNTER — ANESTHESIA (OUTPATIENT)
Dept: POSTOP/PACU | Facility: HOSPITAL | Age: 30
End: 2025-07-16
Payer: COMMERCIAL

## 2025-07-16 RX ORDER — METHOHEXITAL IN WATER/PF 100MG/10ML
SYRINGE (ML) INTRAVENOUS AS NEEDED
Status: DISCONTINUED | OUTPATIENT
Start: 2025-07-16 | End: 2025-07-16 | Stop reason: SURG

## 2025-07-16 RX ADMIN — METHOHEXITAL IN WATER/PF 100 MG: 100MG/10ML SYRINGE (ML) INTRAVENOUS at 06:53:00

## 2025-07-16 RX ADMIN — SODIUM CHLORIDE, SODIUM LACTATE, POTASSIUM CHLORIDE, CALCIUM CHLORIDE: 600; 310; 30; 20 INJECTION, SOLUTION INTRAVENOUS at 07:00:00

## 2025-07-16 RX ADMIN — SODIUM CHLORIDE, SODIUM LACTATE, POTASSIUM CHLORIDE, CALCIUM CHLORIDE: 600; 310; 30; 20 INJECTION, SOLUTION INTRAVENOUS at 06:36:00

## 2025-07-16 NOTE — ANESTHESIA POSTPROCEDURE EVALUATION
[facility]    Zeus Polk Patient Status:  Outpatient   Age/Gender 29 year old female MRN [id number]   Location [facility] POST ANESTHESIA CARE UNIT Attending Kath Tineo MD   Hosp Day # 0 PCP Lucrecia Crain       Anesthesia Post-op Note        Procedure Summary       Date: 07/16/25 Room / Location: [facility] Post Anesthesia Care Unit    Anesthesia Start: 0645 Anesthesia Stop: 0700    Procedure: ECT(BEDSIDE) Diagnosis: Unspecified mood (affective) disorder    Scheduled Providers:  Anesthesiologist: Thad Magana MD    Anesthesia Type: general ASA Status: 3            Anesthesia Type: general    Vitals Value Taken Time   /100 07/16/25 07:00   Temp 98.3 °F (36.8 °C) 07/16/25 07:00   Pulse 97 07/16/25 07:00   Resp 16 07/16/25 07:00   SpO2 93 % 07/16/25 07:00           Patient Location: PACU    Anesthesia Type: general    Airway Patency: patent    Postop Pain Control: adequate    Mental Status: mildly sedated but able to meaningfully participate in the post-anesthesia evaluation    Nausea/Vomiting: none    Cardiopulmonary/Hydration status: stable euvolemic    Complications: no apparent anesthesia related complications    Postop vital signs: stable    Dental Exam: Unchanged from Preop

## 2025-07-16 NOTE — ANESTHESIA PREPROCEDURE EVALUATION
PRE-OP EVALUATION    Patient Name: Zeus Polk    Admit Diagnosis: Depression, unspecified depression type [F32.A]    Pre-op Diagnosis: * No pre-op diagnosis entered *        Anesthesia Procedure: ECT(BEDSIDE)    * No surgeons found in log *    Pre-op vitals reviewed.  Temp: 97.8 °F (36.6 °C)  Pulse: 100  Resp: 18  BP: 132/88  SpO2: 100 %  Body mass index is 41.32 kg/m².    Current medications reviewed.  Hospital Medications:  Current Medications[1]    Outpatient Medications:   Prescriptions Prior to Admission[2]    Allergies: Seasonal      Anesthesia Evaluation    Patient summary reviewed.    Anesthetic Complications           GI/Hepatic/Renal    Negative GI/hepatic/renal ROS.                             Cardiovascular                  (+) hypertension                                     Endo/Other    Negative endo/other ROS.                              Pulmonary    Negative pulmonary ROS.                       Neuro/Psych      (+) depression                        Patient Active Problem List:     Asymmetric tonsils     Unspecified mood (affective) disorder     OCD (obsessive compulsive disorder)     a     Attention deficit hyperactivity disorder (ADHD), combined type     Learning disorder involving mathematics     Binge eating disorder           Past Surgical History[3]  Social Hx on file[4]  History   Drug Use No     Available pre-op labs reviewed.  Lab Results   Component Value Date    WBC 7.3 05/06/2025    RBC 4.54 05/06/2025    HGB 13.5 05/06/2025    HCT 38.5 05/06/2025    MCV 84.8 05/06/2025    MCH 29.7 05/06/2025    MCHC 35.1 05/06/2025    RDW 13.0 05/06/2025    .0 05/06/2025     Lab Results   Component Value Date     05/08/2025    K 4.1 05/08/2025     05/08/2025    CO2 27.0 05/08/2025    BUN 13 05/08/2025    CREATSERUM 1.22 (H) 05/08/2025    GLU 91 05/08/2025    CA 9.1 05/08/2025            Airway      Mallampati: II  Mouth opening: >3 FB  TM distance: > 6 cm  Neck ROM: full  Cardiovascular    Cardiovascular exam normal.         Dental    Dentition appears grossly intact         Pulmonary    Pulmonary exam normal.                 Other findings              ASA: 3   Plan: general  NPO status verified and patient meets guidelines.        Comment: I explained the intrinsic risks of general anesthesia for ECT procedure, including PONV, muscle soreness, dental /lip injury, recall, and other serious but rare complications (life-threatening cardiopulmonary events). All questions were answered and patient demonstrated understanding of realistic expectations and risks of undergoing anesthesia. Informed consent was signed by patient.       Plan/risks discussed with: patient                Present on Admission:  **None**               [1]    lactated ringers infusion   Intravenous Continuous    glycopyrrolate (Robinul) 0.2 MG/ML injection 0.2 mg  0.2 mg Intravenous Once    ketorolac (Toradol) 30 MG/ML injection 30 mg  30 mg Intravenous Once    ondansetron (Zofran) 4 MG/2ML injection 4 mg  4 mg Intravenous Once    [COMPLETED] ondansetron (Zofran) 4 MG/2ML injection        [COMPLETED] ketorolac (Toradol) 30 MG/ML injection        [COMPLETED] glycopyrrolate (Robinul) 0.2 MG/ML injection        [COMPLETED] methohexital (BrevITAL) 100 mg/10mL IV syringe        lactated ringers infusion   Intravenous Continuous    [COMPLETED] glycopyrrolate (Robinul) 0.2 MG/ML injection 0.2 mg  0.2 mg Intravenous Once    [COMPLETED] ketorolac (Toradol) 30 MG/ML injection 30 mg  30 mg Intravenous Once    [COMPLETED] ondansetron (Zofran) 4 MG/2ML injection 4 mg  4 mg Intravenous Once   [2] (Not in a hospital admission)   [3]   Past Surgical History:  Procedure Laterality Date    Oral surgery     [4]   Social History  Socioeconomic History    Marital status: Single   Tobacco Use    Smoking status: Never    Smokeless tobacco: Never   Vaping Use    Vaping status: Never Used   Substance and Sexual Activity    Alcohol use: Yes      Comment: social drinking    Drug use: No

## 2025-07-18 VITALS — BODY MASS INDEX: 41 KG/M2 | HEIGHT: 67 IN

## 2025-07-29 ENCOUNTER — HOSPITAL ENCOUNTER (OUTPATIENT)
Dept: POSTOP/PACU | Facility: HOSPITAL | Age: 30
Discharge: HOME | End: 2025-07-29
Attending: Other

## 2025-08-22 ENCOUNTER — LABORATORY ENCOUNTER (OUTPATIENT)
Dept: LAB | Age: 30
End: 2025-08-22
Attending: Other

## 2025-08-22 DIAGNOSIS — F90.2 ATTENTION DEFICIT HYPERACTIVITY DISORDER (ADHD), COMBINED TYPE: ICD-10-CM

## 2025-08-22 DIAGNOSIS — F42.9 OBSESSIVE-COMPULSIVE DISORDER, UNSPECIFIED TYPE: ICD-10-CM

## 2025-08-22 LAB
ANION GAP SERPL CALC-SCNC: 9 MMOL/L (ref 0–18)
BUN BLD-MCNC: 9 MG/DL (ref 9–23)
BUN/CREAT SERPL: 6.8 (ref 10–20)
CALCIUM BLD-MCNC: 9.2 MG/DL (ref 8.7–10.4)
CHLORIDE SERPL-SCNC: 105 MMOL/L (ref 98–112)
CO2 SERPL-SCNC: 24 MMOL/L (ref 21–32)
CREAT BLD-MCNC: 1.32 MG/DL (ref 0.55–1.02)
EGFRCR SERPLBLD CKD-EPI 2021: 56 ML/MIN/1.73M2 (ref 60–?)
FASTING STATUS PATIENT QL REPORTED: YES
GLUCOSE BLD-MCNC: 112 MG/DL (ref 70–99)
OSMOLALITY SERPL CALC.SUM OF ELEC: 285 MOSM/KG (ref 275–295)
POTASSIUM SERPL-SCNC: 3.7 MMOL/L (ref 3.5–5.1)
SODIUM SERPL-SCNC: 138 MMOL/L (ref 136–145)

## 2025-08-22 PROCEDURE — 36415 COLL VENOUS BLD VENIPUNCTURE: CPT

## 2025-08-22 PROCEDURE — 80048 BASIC METABOLIC PNL TOTAL CA: CPT

## 2025-08-26 ENCOUNTER — ANESTHESIA EVENT (OUTPATIENT)
Dept: POSTOP/PACU | Facility: HOSPITAL | Age: 30
End: 2025-08-26

## 2025-08-26 ENCOUNTER — ANESTHESIA (OUTPATIENT)
Dept: POSTOP/PACU | Facility: HOSPITAL | Age: 30
End: 2025-08-26

## 2025-08-26 RX ORDER — ETOMIDATE 2 MG/ML
INJECTION INTRAVENOUS AS NEEDED
Status: DISCONTINUED | OUTPATIENT
Start: 2025-08-26 | End: 2025-08-26 | Stop reason: SURG

## 2025-08-26 RX ADMIN — SODIUM CHLORIDE, SODIUM LACTATE, POTASSIUM CHLORIDE, CALCIUM CHLORIDE: 600; 310; 30; 20 INJECTION, SOLUTION INTRAVENOUS at 07:43:00

## 2025-08-26 RX ADMIN — ETOMIDATE 100 MG: 2 INJECTION INTRAVENOUS at 07:47:00

## (undated) NOTE — LETTER
Date: 12/30/2022    Patient Name: Abram Parish          To Whom it may concern: This letter has been written at the patient's request. The above patient was seen at the [facility] for treatment of a medical condition. This patient should be excused from attending work from 12/30/2022 through 1/1/2023. The patient may return to work on day #6, 1/2/2023 if fever free with no fever reducer for 24 hours. Must wear a mask until day #11.          Sincerely,        Kasey Bedoya, APRN